# Patient Record
Sex: FEMALE | Race: OTHER | NOT HISPANIC OR LATINO | Employment: UNEMPLOYED | URBAN - METROPOLITAN AREA
[De-identification: names, ages, dates, MRNs, and addresses within clinical notes are randomized per-mention and may not be internally consistent; named-entity substitution may affect disease eponyms.]

---

## 2018-03-27 ENCOUNTER — HISTORICAL (OUTPATIENT)
Dept: ADMINISTRATIVE | Facility: HOSPITAL | Age: 48
End: 2018-03-27

## 2018-03-27 LAB
ABS NEUT (OLG): 2.7
ALBUMIN SERPL-MCNC: 4.3 GM/DL (ref 3.4–5)
ALBUMIN/GLOB SERPL: 1.72 {RATIO} (ref 1.5–2.5)
ALP SERPL-CCNC: 36 UNIT/L (ref 38–126)
ALT SERPL-CCNC: 22 UNIT/L (ref 7–52)
AST SERPL-CCNC: 17 UNIT/L (ref 15–37)
BILIRUB SERPL-MCNC: 0.4 MG/DL (ref 0.2–1)
BILIRUBIN DIRECT+TOT PNL SERPL-MCNC: 0.1 MG/DL (ref 0–0.5)
BUN SERPL-MCNC: 18 MG/DL (ref 7–18)
CALCIUM SERPL-MCNC: 8.9 MG/DL (ref 8.5–10)
CHLORIDE SERPL-SCNC: 105 MMOL/L (ref 98–107)
CO2 SERPL-SCNC: 28 MMOL/L (ref 21–32)
CREAT SERPL-MCNC: 0.63 MG/DL (ref 0.6–1.3)
CREAT/UREA NIT SERPL: 28.6
ERYTHROCYTE [DISTWIDTH] IN BLOOD BY AUTOMATED COUNT: 12.6 % (ref 11.5–17)
GGT SERPL-CCNC: 18 UNIT/L (ref 5–85)
GLOBULIN SER-MCNC: 2.5 GM/DL (ref 1.2–3)
GLUCOSE SERPL-MCNC: 95 MG/DL (ref 74–106)
H PYLORI AB SER IA-ACNC: POSITIVE
HCT VFR BLD AUTO: 37.1 % (ref 37–47)
HGB BLD-MCNC: 12 GM/DL (ref 12–16)
LDH SERPL-CCNC: 122 UNIT/L (ref 140–271)
LYMPHOCYTES # BLD AUTO: 2.1 X10(3)/MCL (ref 0.6–3.4)
LYMPHOCYTES NFR BLD AUTO: 41.3 % (ref 13–40)
MCH RBC QN AUTO: 29.8 PG (ref 27–31.2)
MCHC RBC AUTO-ENTMCNC: 32 GM/DL (ref 32–36)
MCV RBC AUTO: 92 FL (ref 80–94)
MONOCYTES # BLD AUTO: 0.3 X10(3)/MCL (ref 0–1.8)
MONOCYTES NFR BLD AUTO: 6.1 % (ref 0.1–24)
NEUTROPHILS NFR BLD AUTO: 52.6 % (ref 47–80)
PLATELET # BLD AUTO: 239 X10(3)/MCL (ref 130–400)
PMV BLD AUTO: 9.2 FL
POTASSIUM SERPL-SCNC: 4.2 MMOL/L (ref 3.5–5.1)
PROT SERPL-MCNC: 6.8 GM/DL (ref 6.4–8.2)
RBC # BLD AUTO: 4.03 X10(6)/MCL (ref 4.2–5.4)
SODIUM SERPL-SCNC: 138 MMOL/L (ref 136–145)
WBC # SPEC AUTO: 5.1 X10(3)/MCL (ref 4.5–11.5)

## 2018-11-30 ENCOUNTER — HISTORICAL (OUTPATIENT)
Dept: ADMINISTRATIVE | Facility: HOSPITAL | Age: 48
End: 2018-11-30

## 2018-11-30 LAB
ABS NEUT (OLG): 2.5 X10(3)/MCL (ref 2.1–9.2)
ALBUMIN SERPL-MCNC: 4.1 GM/DL (ref 3.4–5)
ALBUMIN/GLOB SERPL: 1.32 {RATIO} (ref 1.5–2.5)
ALP SERPL-CCNC: 33 UNIT/L (ref 38–126)
ALT SERPL-CCNC: 20 UNIT/L (ref 7–52)
APPEARANCE, UA: CLEAR
AST SERPL-CCNC: 19 UNIT/L (ref 15–37)
BACTERIA #/AREA URNS AUTO: ABNORMAL /HPF
BILIRUB SERPL-MCNC: 0.3 MG/DL (ref 0.2–1)
BILIRUB UR QL STRIP: NEGATIVE MG/DL
BILIRUBIN DIRECT+TOT PNL SERPL-MCNC: 0.1 MG/DL (ref 0–0.5)
BILIRUBIN DIRECT+TOT PNL SERPL-MCNC: 0.2 MG/DL
BUN SERPL-MCNC: 17 MG/DL (ref 7–18)
CALCIUM SERPL-MCNC: 8.5 MG/DL (ref 8.5–10)
CHLORIDE SERPL-SCNC: 101 MMOL/L (ref 98–107)
CHOLEST SERPL-MCNC: 127 MG/DL (ref 0–200)
CHOLEST/HDLC SERPL: 3.8 {RATIO}
CO2 SERPL-SCNC: 28 MMOL/L (ref 21–32)
COLOR UR: YELLOW
CREAT SERPL-MCNC: 0.61 MG/DL (ref 0.6–1.3)
ERYTHROCYTE [DISTWIDTH] IN BLOOD BY AUTOMATED COUNT: 14.1 % (ref 11.5–17)
GLOBULIN SER-MCNC: 3.1 GM/DL (ref 1.2–3)
GLUCOSE (UA): NEGATIVE MG/DL
GLUCOSE SERPL-MCNC: 95 MG/DL (ref 74–106)
H PYLORI AB SER IA-ACNC: POSITIVE
HCT VFR BLD AUTO: 33 % (ref 37–47)
HDLC SERPL-MCNC: 33 MG/DL (ref 35–60)
HGB BLD-MCNC: 9.4 GM/DL (ref 12–16)
HGB UR QL STRIP: NEGATIVE UNIT/L
KETONES UR QL STRIP: NEGATIVE MG/DL
LDLC SERPL CALC-MCNC: 72 MG/DL (ref 0–129)
LEUKOCYTE ESTERASE UR QL STRIP: NEGATIVE UNIT/L
LYMPHOCYTES # BLD AUTO: 2 X10(3)/MCL (ref 0.6–3.4)
LYMPHOCYTES NFR BLD AUTO: 41.9 % (ref 13–40)
MCH RBC QN AUTO: 23.9 PG (ref 27–31.2)
MCHC RBC AUTO-ENTMCNC: 28 GM/DL (ref 32–36)
MCV RBC AUTO: 84 FL (ref 80–94)
MONOCYTES # BLD AUTO: 0.3 X10(3)/MCL (ref 0.1–1.3)
MONOCYTES NFR BLD AUTO: 6.9 % (ref 0.1–24)
NEUTROPHILS NFR BLD AUTO: 51.2 % (ref 47–80)
NITRITE UR QL STRIP.AUTO: NEGATIVE
PH UR STRIP: 7 [PH]
PLATELET # BLD AUTO: 251 X10(3)/MCL (ref 130–400)
PMV BLD AUTO: 8.8 FL (ref 9.4–12.4)
POTASSIUM SERPL-SCNC: 4 MMOL/L (ref 3.5–5.1)
PROT SERPL-MCNC: 7.2 GM/DL (ref 6.4–8.2)
PROT UR QL STRIP: ABNORMAL MG/DL
RBC # BLD AUTO: 3.93 X10(6)/MCL (ref 4.2–5.4)
RBC #/AREA URNS HPF: ABNORMAL /HPF
SODIUM SERPL-SCNC: 134 MMOL/L (ref 136–145)
SP GR UR STRIP: 1.02
SQUAMOUS EPITHELIAL, UA: ABNORMAL /LPF
TRIGL SERPL-MCNC: 141 MG/DL (ref 30–150)
TSH SERPL-ACNC: 0.74 MIU/ML (ref 0.35–4.94)
UROBILINOGEN UR STRIP-ACNC: 0.2 MG/DL
VLDLC SERPL CALC-MCNC: 28.2 MG/DL
WBC # SPEC AUTO: 4.8 X10(3)/MCL (ref 4.5–11.5)
WBC #/AREA URNS AUTO: ABNORMAL /[HPF]

## 2019-03-25 ENCOUNTER — HISTORICAL (OUTPATIENT)
Dept: ADMINISTRATIVE | Facility: HOSPITAL | Age: 49
End: 2019-03-25

## 2019-03-25 LAB
ABS NEUT (OLG): 4.4 X10(3)/MCL (ref 2.1–9.2)
ERYTHROCYTE [DISTWIDTH] IN BLOOD BY AUTOMATED COUNT: 15.3 % (ref 11.5–17)
HCT VFR BLD AUTO: 29.5 % (ref 37–47)
HGB BLD-MCNC: 8.5 GM/DL (ref 12–16)
LYMPHOCYTES # BLD AUTO: 2.6 X10(3)/MCL (ref 0.6–3.4)
LYMPHOCYTES NFR BLD AUTO: 35.5 % (ref 13–40)
MCH RBC QN AUTO: 22 PG (ref 27–31.2)
MCHC RBC AUTO-ENTMCNC: 29 GM/DL (ref 32–36)
MCV RBC AUTO: 76 FL (ref 80–94)
MONOCYTES # BLD AUTO: 0.3 X10(3)/MCL (ref 0.1–1.3)
MONOCYTES NFR BLD AUTO: 4.5 % (ref 0.1–24)
NEUTROPHILS NFR BLD AUTO: 60 % (ref 47–80)
PLATELET # BLD AUTO: 331 X10(3)/MCL (ref 130–400)
PMV BLD AUTO: 8.4 FL (ref 9.4–12.4)
RBC # BLD AUTO: 3.87 X10(6)/MCL (ref 4.2–5.4)
WBC # SPEC AUTO: 7.3 X10(3)/MCL (ref 4.5–11.5)

## 2019-08-08 ENCOUNTER — HISTORICAL (OUTPATIENT)
Dept: ADMINISTRATIVE | Facility: HOSPITAL | Age: 49
End: 2019-08-08

## 2019-08-08 LAB
ABS NEUT (OLG): 2.8 X10(3)/MCL (ref 2.1–9.2)
ALBUMIN SERPL-MCNC: 4.3 GM/DL (ref 3.4–5)
ALBUMIN/GLOB SERPL: 1.54 {RATIO} (ref 1.5–2.5)
ALP SERPL-CCNC: 40 UNIT/L (ref 38–126)
ALT SERPL-CCNC: 12 UNIT/L (ref 7–52)
AST SERPL-CCNC: 13 UNIT/L (ref 15–37)
BILIRUB SERPL-MCNC: 0.3 MG/DL (ref 0.2–1)
BILIRUBIN DIRECT+TOT PNL SERPL-MCNC: 0.1 MG/DL (ref 0–0.5)
BILIRUBIN DIRECT+TOT PNL SERPL-MCNC: 0.2 MG/DL
BUN SERPL-MCNC: 29 MG/DL (ref 7–18)
CALCIUM SERPL-MCNC: 9.1 MG/DL (ref 8.5–10)
CHLORIDE SERPL-SCNC: 104 MMOL/L (ref 98–107)
CO2 SERPL-SCNC: 26 MMOL/L (ref 21–32)
CREAT SERPL-MCNC: 0.7 MG/DL (ref 0.6–1.3)
ERYTHROCYTE [DISTWIDTH] IN BLOOD BY AUTOMATED COUNT: 17.3 % (ref 11.5–17)
GLOBULIN SER-MCNC: 2.8 GM/DL (ref 1.2–3)
GLUCOSE SERPL-MCNC: 92 MG/DL (ref 74–106)
H PYLORI AB SER IA-ACNC: POSITIVE
HCT VFR BLD AUTO: 29 % (ref 37–47)
HGB BLD-MCNC: 8.4 GM/DL (ref 12–16)
LYMPHOCYTES # BLD AUTO: 2.4 X10(3)/MCL (ref 0.6–3.4)
LYMPHOCYTES NFR BLD AUTO: 43.9 % (ref 13–40)
MCH RBC QN AUTO: 20.8 PG (ref 27–31.2)
MCHC RBC AUTO-ENTMCNC: 29 GM/DL (ref 32–36)
MCV RBC AUTO: 72 FL (ref 80–94)
MONOCYTES # BLD AUTO: 0.3 X10(3)/MCL (ref 0.1–1.3)
MONOCYTES NFR BLD AUTO: 5.9 % (ref 0.1–24)
NEUTROPHILS NFR BLD AUTO: 50.2 % (ref 47–80)
PLATELET # BLD AUTO: 327 X10(3)/MCL (ref 130–400)
PMV BLD AUTO: 8.7 FL (ref 9.4–12.4)
POTASSIUM SERPL-SCNC: 4.1 MMOL/L (ref 3.5–5.1)
PROT SERPL-MCNC: 7.1 GM/DL (ref 6.4–8.2)
RBC # BLD AUTO: 4.04 X10(6)/MCL (ref 4.2–5.4)
SODIUM SERPL-SCNC: 137 MMOL/L (ref 136–145)
TSH SERPL-ACNC: 0.58 MIU/ML (ref 0.35–4.94)
WBC # SPEC AUTO: 5.5 X10(3)/MCL (ref 4.5–11.5)

## 2019-08-21 LAB — CRC RECOMMENDATION EXT: NORMAL

## 2019-09-03 ENCOUNTER — HISTORICAL (OUTPATIENT)
Dept: LAB | Facility: HOSPITAL | Age: 49
End: 2019-09-03

## 2020-10-16 ENCOUNTER — HISTORICAL (OUTPATIENT)
Dept: ADMINISTRATIVE | Facility: HOSPITAL | Age: 50
End: 2020-10-16

## 2020-10-16 LAB
ABS NEUT (OLG): 2.94 X10(3)/MCL (ref 2.1–9.2)
ALBUMIN SERPL-MCNC: 3.4 GM/DL (ref 3.5–5)
ALBUMIN/GLOB SERPL: 0.9 RATIO (ref 1.1–2)
ALP SERPL-CCNC: 60 UNIT/L (ref 40–150)
ALT SERPL-CCNC: 29 UNIT/L (ref 0–55)
AST SERPL-CCNC: 21 UNIT/L (ref 5–34)
BASOPHILS # BLD AUTO: 0 X10(3)/MCL (ref 0–0.2)
BASOPHILS NFR BLD AUTO: 0 %
BILIRUB SERPL-MCNC: 0.4 MG/DL
BILIRUBIN DIRECT+TOT PNL SERPL-MCNC: 0.2 MG/DL (ref 0–0.5)
BILIRUBIN DIRECT+TOT PNL SERPL-MCNC: 0.2 MG/DL (ref 0–0.8)
BUN SERPL-MCNC: 18.9 MG/DL (ref 9.8–20.1)
CALCIUM SERPL-MCNC: 8.9 MG/DL (ref 8.4–10.2)
CHLORIDE SERPL-SCNC: 106 MMOL/L (ref 98–107)
CHOLEST SERPL-MCNC: 134 MG/DL
CHOLEST/HDLC SERPL: 4 {RATIO} (ref 0–5)
CO2 SERPL-SCNC: 23 MMOL/L (ref 22–29)
CREAT SERPL-MCNC: 0.64 MG/DL (ref 0.55–1.02)
DEPRECATED CALCIDIOL+CALCIFEROL SERPL-MC: 12.2 NG/ML (ref 6.6–49.9)
EOSINOPHIL # BLD AUTO: 0.2 X10(3)/MCL (ref 0–0.9)
EOSINOPHIL NFR BLD AUTO: 3 %
ERYTHROCYTE [DISTWIDTH] IN BLOOD BY AUTOMATED COUNT: 13.2 % (ref 11.5–17)
EST. AVERAGE GLUCOSE BLD GHB EST-MCNC: 116.9 MG/DL
GLOBULIN SER-MCNC: 3.7 GM/DL (ref 2.4–3.5)
GLUCOSE SERPL-MCNC: 87 MG/DL (ref 74–100)
HBA1C MFR BLD: 5.7 %
HCT VFR BLD AUTO: 38.4 % (ref 37–47)
HDLC SERPL-MCNC: 35 MG/DL (ref 35–60)
HGB BLD-MCNC: 12.6 GM/DL (ref 12–16)
LDLC SERPL CALC-MCNC: 75 MG/DL (ref 50–140)
LYMPHOCYTES # BLD AUTO: 2.2 X10(3)/MCL (ref 0.6–4.6)
LYMPHOCYTES NFR BLD AUTO: 39 %
MCH RBC QN AUTO: 29.6 PG (ref 27–31)
MCHC RBC AUTO-ENTMCNC: 32.8 GM/DL (ref 33–36)
MCV RBC AUTO: 90.4 FL (ref 80–94)
MONOCYTES # BLD AUTO: 0.2 X10(3)/MCL (ref 0.1–1.3)
MONOCYTES NFR BLD AUTO: 4 %
NEUTROPHILS # BLD AUTO: 2.94 X10(3)/MCL (ref 2.1–9.2)
NEUTROPHILS NFR BLD AUTO: 53 %
PLATELET # BLD AUTO: 274 X10(3)/MCL (ref 130–400)
PMV BLD AUTO: 10.9 FL (ref 9.4–12.4)
POTASSIUM SERPL-SCNC: 4.2 MMOL/L (ref 3.5–5.1)
PROT SERPL-MCNC: 7.1 GM/DL (ref 6.4–8.3)
RBC # BLD AUTO: 4.25 X10(6)/MCL (ref 4.2–5.4)
SODIUM SERPL-SCNC: 141 MMOL/L (ref 136–145)
TRIGL SERPL-MCNC: 118 MG/DL (ref 37–140)
TSH SERPL-ACNC: 0.94 UIU/ML (ref 0.35–4.94)
VLDLC SERPL CALC-MCNC: 24 MG/DL
WBC # SPEC AUTO: 5.5 X10(3)/MCL (ref 4.5–11.5)

## 2021-06-07 LAB — BCS RECOMMENDATION EXT: NORMAL

## 2021-09-07 ENCOUNTER — HISTORICAL (OUTPATIENT)
Dept: RADIOLOGY | Facility: HOSPITAL | Age: 51
End: 2021-09-07

## 2021-09-07 LAB
T3RU NFR SERPL: 28.38 % (ref 31–39)
T4 FREE SERPL-MCNC: 0.84 NG/DL (ref 0.7–1.48)
TSH SERPL-ACNC: 0.64 UIU/ML (ref 0.35–4.94)

## 2022-02-25 ENCOUNTER — HISTORICAL (OUTPATIENT)
Dept: ADMINISTRATIVE | Facility: HOSPITAL | Age: 52
End: 2022-02-25

## 2022-02-25 LAB
ABS NEUT (OLG): 3.31 (ref 2.1–9.2)
ALBUMIN SERPL-MCNC: 3.9 G/DL (ref 3.5–5)
ALBUMIN/GLOB SERPL: 1.2 {RATIO} (ref 1.1–2)
ALP SERPL-CCNC: 59 U/L (ref 40–150)
ALT SERPL-CCNC: 24 U/L (ref 0–55)
AST SERPL-CCNC: 19 U/L (ref 5–34)
BASOPHILS # BLD AUTO: 0 10*3/UL (ref 0–0.2)
BASOPHILS NFR BLD AUTO: 1 %
BILIRUB SERPL-MCNC: 0.4 MG/DL
BILIRUBIN DIRECT+TOT PNL SERPL-MCNC: 0.2 (ref 0–0.5)
BILIRUBIN DIRECT+TOT PNL SERPL-MCNC: 0.2 (ref 0–0.8)
BUN SERPL-MCNC: 20.9 MG/DL (ref 9.8–20.1)
CALCIUM SERPL-MCNC: 9.3 MG/DL (ref 8.7–10.5)
CHLORIDE SERPL-SCNC: 106 MMOL/L (ref 98–107)
CHOLEST SERPL-MCNC: 140 MG/DL
CHOLEST/HDLC SERPL: 4 {RATIO} (ref 0–5)
CO2 SERPL-SCNC: 22 MMOL/L (ref 22–29)
CREAT SERPL-MCNC: 0.67 MG/DL (ref 0.55–1.02)
DEPRECATED CALCIDIOL+CALCIFEROL SERPL-MC: 9.8 NG/ML (ref 30–80)
EOSINOPHIL # BLD AUTO: 0.2 10*3/UL (ref 0–0.9)
EOSINOPHIL NFR BLD AUTO: 3 %
ERYTHROCYTE [DISTWIDTH] IN BLOOD BY AUTOMATED COUNT: 14.8 % (ref 11.5–17)
EST. AVERAGE GLUCOSE BLD GHB EST-MCNC: 131.2 MG/DL
GLOBULIN SER-MCNC: 3.2 G/DL (ref 2.4–3.5)
GLUCOSE SERPL-MCNC: 115 MG/DL (ref 74–100)
HBA1C MFR BLD: 6.2 %
HCT VFR BLD AUTO: 34 % (ref 37–47)
HDLC SERPL-MCNC: 37 MG/DL (ref 35–60)
HEMOLYSIS INTERF INDEX SERPL-ACNC: 12
HGB BLD-MCNC: 10.3 G/DL (ref 12–16)
ICTERIC INTERF INDEX SERPL-ACNC: 0
LDLC SERPL CALC-MCNC: 71 MG/DL (ref 50–140)
LIPEMIC INTERF INDEX SERPL-ACNC: 3
LYMPHOCYTES # BLD AUTO: 1.4 10*3/UL (ref 0.6–4.6)
LYMPHOCYTES NFR BLD AUTO: 27 %
MANUAL DIFF? (OHS): NO
MCH RBC QN AUTO: 24.5 PG (ref 27–31)
MCHC RBC AUTO-ENTMCNC: 30.3 G/DL (ref 33–36)
MCV RBC AUTO: 80.8 FL (ref 80–94)
MONOCYTES # BLD AUTO: 0.3 10*3/UL (ref 0.1–1.3)
MONOCYTES NFR BLD AUTO: 6 %
NEUTROPHILS # BLD AUTO: 3.31 10*3/UL (ref 2.1–9.2)
NEUTROPHILS NFR BLD AUTO: 62 %
PLATELET # BLD AUTO: 268 10*3/UL (ref 130–400)
PMV BLD AUTO: 10.5 FL (ref 9.4–12.4)
POTASSIUM SERPL-SCNC: 4.1 MMOL/L (ref 3.5–5.1)
PROT SERPL-MCNC: 7.1 G/DL (ref 6.4–8.3)
RBC # BLD AUTO: 4.21 10*6/UL (ref 4.2–5.4)
SODIUM SERPL-SCNC: 139 MMOL/L (ref 136–145)
TRIGL SERPL-MCNC: 162 MG/DL (ref 37–140)
TSH SERPL-ACNC: 0.82 M[IU]/L (ref 0.35–4.94)
VLDLC SERPL CALC-MCNC: 32 MG/DL
WBC # SPEC AUTO: 5.3 10*3/UL (ref 4.5–11.5)

## 2022-04-11 ENCOUNTER — HISTORICAL (OUTPATIENT)
Dept: ADMINISTRATIVE | Facility: HOSPITAL | Age: 52
End: 2022-04-11
Payer: COMMERCIAL

## 2022-04-29 VITALS
SYSTOLIC BLOOD PRESSURE: 115 MMHG | OXYGEN SATURATION: 100 % | DIASTOLIC BLOOD PRESSURE: 78 MMHG | WEIGHT: 177.5 LBS | HEIGHT: 64 IN | BODY MASS INDEX: 30.3 KG/M2

## 2022-06-12 ENCOUNTER — HOSPITAL ENCOUNTER (EMERGENCY)
Facility: HOSPITAL | Age: 52
Discharge: HOME | End: 2022-06-12
Attending: EMERGENCY MEDICINE
Payer: COMMERCIAL

## 2022-06-12 VITALS
TEMPERATURE: 98 F | HEART RATE: 85 BPM | HEIGHT: 62 IN | SYSTOLIC BLOOD PRESSURE: 132 MMHG | RESPIRATION RATE: 18 BRPM | BODY MASS INDEX: 30.91 KG/M2 | WEIGHT: 168 LBS | OXYGEN SATURATION: 100 % | DIASTOLIC BLOOD PRESSURE: 81 MMHG

## 2022-06-12 DIAGNOSIS — K29.50 CHRONIC GASTRITIS WITHOUT BLEEDING, UNSPECIFIED GASTRITIS TYPE: ICD-10-CM

## 2022-06-12 DIAGNOSIS — R19.7 DIARRHEA, UNSPECIFIED TYPE: Primary | ICD-10-CM

## 2022-06-12 LAB
ALBUMIN SERPL-MCNC: 4.4 GM/DL (ref 3.5–5)
ALBUMIN/GLOB SERPL: 1.3 RATIO (ref 1.1–2)
ALP SERPL-CCNC: 72 UNIT/L (ref 40–150)
ALT SERPL-CCNC: 34 UNIT/L (ref 0–55)
APPEARANCE UR: ABNORMAL
AST SERPL-CCNC: 28 UNIT/L (ref 5–34)
B-HCG UR QL: NEGATIVE
BACTERIA #/AREA URNS AUTO: ABNORMAL /HPF
BASOPHILS # BLD AUTO: 0.04 X10(3)/MCL (ref 0–0.2)
BASOPHILS NFR BLD AUTO: 0.7 %
BILIRUB UR QL STRIP.AUTO: NEGATIVE MG/DL
BILIRUBIN DIRECT+TOT PNL SERPL-MCNC: 0.3 MG/DL
BUN SERPL-MCNC: 17.5 MG/DL (ref 9.8–20.1)
CALCIUM SERPL-MCNC: 9.4 MG/DL (ref 8.4–10.2)
CHLORIDE SERPL-SCNC: 108 MMOL/L (ref 98–107)
CO2 SERPL-SCNC: 20 MMOL/L (ref 22–29)
COLOR UR AUTO: YELLOW
CREAT SERPL-MCNC: 0.71 MG/DL (ref 0.55–1.02)
CTP QC/QA: YES
EOSINOPHIL # BLD AUTO: 0.13 X10(3)/MCL (ref 0–0.9)
EOSINOPHIL NFR BLD AUTO: 2.1 %
ERYTHROCYTE [DISTWIDTH] IN BLOOD BY AUTOMATED COUNT: 17.1 % (ref 11.5–17)
GLOBULIN SER-MCNC: 3.4 GM/DL (ref 2.4–3.5)
GLUCOSE SERPL-MCNC: 90 MG/DL (ref 74–100)
GLUCOSE UR QL STRIP.AUTO: NEGATIVE MG/DL
HCT VFR BLD AUTO: 35.1 % (ref 37–47)
HGB BLD-MCNC: 10.2 GM/DL (ref 12–16)
IMM GRANULOCYTES # BLD AUTO: 0.01 X10(3)/MCL (ref 0–0.02)
IMM GRANULOCYTES NFR BLD AUTO: 0.2 % (ref 0–0.43)
KETONES UR QL STRIP.AUTO: ABNORMAL MG/DL
LEUKOCYTE ESTERASE UR QL STRIP.AUTO: ABNORMAL UNIT/L
LIPASE SERPL-CCNC: 26 U/L
LYMPHOCYTES # BLD AUTO: 2.49 X10(3)/MCL (ref 0.6–4.6)
LYMPHOCYTES NFR BLD AUTO: 40.6 %
MCH RBC QN AUTO: 22 PG (ref 27–31)
MCHC RBC AUTO-ENTMCNC: 29.1 MG/DL (ref 33–36)
MCV RBC AUTO: 75.6 FL (ref 80–94)
MONOCYTES # BLD AUTO: 0.32 X10(3)/MCL (ref 0.1–1.3)
MONOCYTES NFR BLD AUTO: 5.2 %
MUCOUS THREADS URNS QL MICRO: ABNORMAL /LPF
NEUTROPHILS # BLD AUTO: 3.1 X10(3)/MCL (ref 2.1–9.2)
NEUTROPHILS NFR BLD AUTO: 51.2 %
NITRITE UR QL STRIP.AUTO: NEGATIVE
NRBC BLD AUTO-RTO: 0 %
PH UR STRIP.AUTO: 6 [PH]
PLATELET # BLD AUTO: 352 X10(3)/MCL (ref 130–400)
PMV BLD AUTO: 10.3 FL (ref 9.4–12.4)
POTASSIUM SERPL-SCNC: 3.8 MMOL/L (ref 3.5–5.1)
PROT SERPL-MCNC: 7.8 GM/DL (ref 6.4–8.3)
PROT UR QL STRIP.AUTO: ABNORMAL MG/DL
RBC # BLD AUTO: 4.64 X10(6)/MCL (ref 4.2–5.4)
RBC #/AREA URNS AUTO: <5 /HPF
RBC UR QL AUTO: NEGATIVE UNIT/L
SODIUM SERPL-SCNC: 135 MMOL/L (ref 136–145)
SP GR UR STRIP.AUTO: 1.03 (ref 1–1.03)
SQUAMOUS #/AREA URNS AUTO: >36 /LPF
UROBILINOGEN UR STRIP-ACNC: 0.2 MG/DL
WBC # SPEC AUTO: 6.1 X10(3)/MCL (ref 4.5–11.5)
WBC #/AREA URNS AUTO: 11 /HPF

## 2022-06-12 PROCEDURE — 96360 HYDRATION IV INFUSION INIT: CPT

## 2022-06-12 PROCEDURE — 81025 URINE PREGNANCY TEST: CPT | Performed by: PHYSICIAN ASSISTANT

## 2022-06-12 PROCEDURE — 99284 EMERGENCY DEPT VISIT MOD MDM: CPT | Mod: 25

## 2022-06-12 PROCEDURE — 85025 COMPLETE CBC W/AUTO DIFF WBC: CPT | Performed by: PHYSICIAN ASSISTANT

## 2022-06-12 PROCEDURE — 25000003 PHARM REV CODE 250: Performed by: PHYSICIAN ASSISTANT

## 2022-06-12 PROCEDURE — 83690 ASSAY OF LIPASE: CPT | Performed by: PHYSICIAN ASSISTANT

## 2022-06-12 PROCEDURE — 36415 COLL VENOUS BLD VENIPUNCTURE: CPT | Performed by: PHYSICIAN ASSISTANT

## 2022-06-12 PROCEDURE — 80053 COMPREHEN METABOLIC PANEL: CPT | Performed by: PHYSICIAN ASSISTANT

## 2022-06-12 PROCEDURE — 81001 URINALYSIS AUTO W/SCOPE: CPT | Performed by: PHYSICIAN ASSISTANT

## 2022-06-12 RX ORDER — OMEPRAZOLE 40 MG/1
40 CAPSULE, DELAYED RELEASE ORAL DAILY
COMMUNITY
Start: 2022-06-10 | End: 2022-06-12

## 2022-06-12 RX ORDER — FAMOTIDINE 20 MG/1
20 TABLET, FILM COATED ORAL
Status: COMPLETED | OUTPATIENT
Start: 2022-06-12 | End: 2022-06-12

## 2022-06-12 RX ORDER — OMEPRAZOLE 20 MG/1
20 CAPSULE, DELAYED RELEASE ORAL DAILY
Qty: 14 CAPSULE | Refills: 0 | Status: SHIPPED | OUTPATIENT
Start: 2022-06-12 | End: 2023-02-22 | Stop reason: DRUGHIGH

## 2022-06-12 RX ORDER — ONDANSETRON 4 MG/1
8 TABLET, ORALLY DISINTEGRATING ORAL EVERY 8 HOURS PRN
Qty: 12 TABLET | Refills: 0 | Status: SHIPPED | OUTPATIENT
Start: 2022-06-12 | End: 2022-06-14

## 2022-06-12 RX ORDER — SUCRALFATE 1 G/10ML
1 SUSPENSION ORAL 4 TIMES DAILY
Qty: 280 ML | Refills: 0 | Status: SHIPPED | OUTPATIENT
Start: 2022-06-12 | End: 2022-06-19

## 2022-06-12 RX ADMIN — Medication 20 MG: at 05:06

## 2022-06-12 RX ADMIN — SODIUM CHLORIDE 1000 ML: 9 INJECTION, SOLUTION INTRAVENOUS at 05:06

## 2022-06-12 NOTE — ED PROVIDER NOTES
Encounter Date: 6/12/2022       History     Chief Complaint   Patient presents with    Diarrhea     Patient reports diarrhea, nausea and abd cramping for 10 days     Patient presents with:  Diarrhea: Patient reports diarrhea, nausea and abd cramping for 10 days    I Moe Jodie WILLIS assumed care of pt at 16 30, female reports the last 10 days having 7 episodes of daily nonbloody diarrhea without fever or vomiting.  Patient reports history of stomach ulcer previously on PPI now on over-the-counter Pepto for the last 4 months.  Patient reports initial evaluation emergency department today for severe diffuse abdominal cramping pain onset at 10:00 a.m. trial of home water and medication no relief transported by daughter to emergency department for evaluation.    The history is provided by the patient and a relative. The history is limited by a language barrier. A  was used.   Diarrhea   This is a new problem. The current episode started several days ago. The problem occurs more than 10 times per day. The stool consistency is described as watery. Associated symptoms include abdominal pain. Pertinent negatives include no arthralgias, chills, coughing, fever, myalgias, sweats or vomiting.     Review of patient's allergies indicates:  No Known Allergies  No past medical history on file.  No past surgical history on file.  No family history on file.     Review of Systems   Constitutional: Negative for chills, diaphoresis and fever.   HENT: Negative for congestion and postnasal drip.    Respiratory: Negative for cough and shortness of breath.    Cardiovascular: Negative for chest pain.   Gastrointestinal: Positive for abdominal pain, diarrhea and nausea. Negative for blood in stool, constipation and vomiting.   Endocrine: Negative.    Genitourinary: Negative for dysuria.   Musculoskeletal: Negative for arthralgias, back pain and myalgias.   Skin: Negative.    Neurological: Negative.     Psychiatric/Behavioral: Negative.        Physical Exam     Initial Vitals [06/12/22 1416]   BP Pulse Resp Temp SpO2   120/79 73 18 98.4 °F (36.9 °C) 99 %      MAP       --         Physical Exam    Nursing note and vitals reviewed.  Constitutional: She appears well-developed and well-nourished. She is not diaphoretic. She is active.  Non-toxic appearance. No distress.   HENT:   Head: Normocephalic and atraumatic.   Neck: Trachea normal. Neck supple.   Normal range of motion.  Cardiovascular: Normal rate, regular rhythm and normal pulses.   Pulmonary/Chest: Breath sounds normal.   Abdominal: Abdomen is soft. She exhibits no distension. There is no abdominal tenderness. There is no rebound and no guarding.   Musculoskeletal:         General: Normal range of motion.      Right upper arm: Normal.      Left upper arm: Normal.      Cervical back: Normal, normal range of motion and neck supple.      Thoracic back: Normal.      Lumbar back: Normal.      Right upper leg: Normal.      Left upper leg: Normal.      Right lower leg: Normal.      Left lower leg: Normal.     Neurological: She is alert and oriented to person, place, and time. She has normal strength.   Skin: Skin is warm, dry and intact.   Psychiatric: She has a normal mood and affect.         ED Course   Procedures  Labs Reviewed   COMPREHENSIVE METABOLIC PANEL - Abnormal; Notable for the following components:       Result Value    Sodium Level 135 (*)     Chloride 108 (*)     Carbon Dioxide 20 (*)     All other components within normal limits   URINALYSIS, REFLEX TO URINE CULTURE - Abnormal; Notable for the following components:    Appearance, UA Cloudy (*)     Specific Gravity, UA 1.031 (*)     Protein, UA 1+ (*)     Ketones, UA Trace (*)     Leukocyte Esterase, UA Trace (*)     All other components within normal limits   CBC WITH DIFFERENTIAL - Abnormal; Notable for the following components:    Hgb 10.2 (*)     Hct 35.1 (*)     MCV 75.6 (*)     MCH 22.0 (*)      MCHC 29.1 (*)     RDW 17.1 (*)     All other components within normal limits   URINALYSIS, MICROSCOPIC - Abnormal; Notable for the following components:    WBC, UA 11 (*)     Squamous Epithelial Cells, UA >36 (*)     Mucous, UA Large (*)     All other components within normal limits   LIPASE - Normal   CULTURE, URINE   CBC W/ AUTO DIFFERENTIAL    Narrative:     The following orders were created for panel order CBC Auto Differential.  Procedure                               Abnormality         Status                     ---------                               -----------         ------                     CBC with Differential[[id number]]        Abnormal            Final result                 Please view results for these tests on the individual orders.   POCT URINE PREGNANCY          Imaging Results    None          Medications   sodium chloride 0.9% bolus 1,000 mL (0 mLs Intravenous Stopped 6/12/22 1800)   famotidine tablet 20 mg (20 mg Oral Given 6/12/22 1700)     Medical Decision Making:   Initial Assessment:   Female with acute diarrhea and chronic GERD presents to emergency department for evaluation  Differential Diagnosis:   Acute diarrhea, chronic GERD, abdominal cramps, dehydration             ED Course as of 06/12/22 1954   Sun Jun 12, 2022 1953 Patient resting comfortably in room reports improved greatly with rest offered IV fluid hydration and accepts.  Patient completing IV fluids she is ready for discharge.  Abdomen soft and nontender all quadrants on repeat exam.  Patient understands discharge plan with acute diarrhea OTC anti diarrheal in addition to new PPI for chronic GERD and follow-up. [TQ]   1953 Lipase: 26 [TQ]   1953 WBC: 6.1 [TQ]   1953 Hemoglobin(!): 10.2 [TQ]   1953 Hematocrit(!): 35.1 [TQ]   1954 Sodium(!): 135 [TQ]   1954 Potassium: 3.8 [TQ]   1954 Chloride(!): 108 [TQ]   1954 CO2(!): 20 [TQ]      ED Course User Index  [TQ] LAZARO Reeder             Clinical Impression:    Final diagnoses:  [R19.7] Diarrhea, unspecified type (Primary)  [K29.50] Chronic gastritis without bleeding, unspecified gastritis type          ED Disposition Condition    Discharge Stable        ED Prescriptions     Medication Sig Dispense Start Date End Date Auth. Provider    omeprazole (PRILOSEC) 20 MG capsule Take 1 capsule (20 mg total) by mouth once daily. for 14 days 14 capsule 6/12/2022 6/26/2022 LAZARO Reeder    ondansetron (ZOFRAN-ODT) 4 MG TbDL Take 2 tablets (8 mg total) by mouth every 8 (eight) hours as needed (nausea or vomiting). 12 tablet 6/12/2022 6/14/2022 LAZARO Reeder    sucralfate (CARAFATE) 100 mg/mL suspension Take 10 mLs (1 g total) by mouth 4 (four) times daily. for 7 days 280 mL 6/12/2022 6/19/2022 LAZARO Reeder        Follow-up Information    None          LAZARO Reeder  06/12/22 1954

## 2022-06-12 NOTE — FIRST PROVIDER EVALUATION
Medical screening exam completed.  I have conducted a focused provider triage encounter, findings are as follows:    Brief history of present illness:  51 year old female presents to ED for abdominal pain, diarrhea, and nausea x 10 days; Denies vomiting, rectal bleeding     There were no vitals filed for this visit.    Pertinent physical exam:  Awake, alert     Brief workup plan:  Labs, UA     Preliminary workup initiated; this workup will be continued and followed by the physician or advanced practice provider that is assigned to the patient when roomed.

## 2022-06-12 NOTE — DISCHARGE INSTRUCTIONS
Recommend Imodium for diarrhea over the counter.  Recommend omeprazole and carafate for stomach ulcer.  May alternate mylanta, maalox or pepto for gastrointestinal discomfort.  Zofran if needed for nausea or vomiting.  Recommend follow up with primary care physician this week to review acute diarrhea and chronic gastritis for reevalaution, management and possible GI referral.

## 2022-06-14 LAB — BACTERIA UR CULT: NORMAL

## 2022-07-23 ENCOUNTER — PATIENT OUTREACH (OUTPATIENT)
Dept: ADMINISTRATIVE | Facility: HOSPITAL | Age: 52
End: 2022-07-23
Payer: COMMERCIAL

## 2022-07-23 NOTE — PROGRESS NOTES
Population Health. Out Reach.  The following record(s)  below were uploaded for Health Maintenance .    6/7/21 MAMMOGRAM SCREENING            8/21/19 COLONOSCOPY

## 2022-09-02 ENCOUNTER — TELEPHONE (OUTPATIENT)
Dept: INTERNAL MEDICINE | Facility: CLINIC | Age: 52
End: 2022-09-02
Payer: COMMERCIAL

## 2022-09-08 ENCOUNTER — LAB VISIT (OUTPATIENT)
Dept: LAB | Facility: HOSPITAL | Age: 52
End: 2022-09-08
Attending: INTERNAL MEDICINE
Payer: COMMERCIAL

## 2022-09-08 ENCOUNTER — OFFICE VISIT (OUTPATIENT)
Dept: INTERNAL MEDICINE | Facility: CLINIC | Age: 52
End: 2022-09-08
Payer: COMMERCIAL

## 2022-09-08 VITALS
BODY MASS INDEX: 30.91 KG/M2 | OXYGEN SATURATION: 99 % | HEART RATE: 90 BPM | HEIGHT: 62 IN | DIASTOLIC BLOOD PRESSURE: 74 MMHG | SYSTOLIC BLOOD PRESSURE: 112 MMHG | TEMPERATURE: 97 F | WEIGHT: 168 LBS

## 2022-09-08 DIAGNOSIS — E88.819 INSULIN RESISTANCE: ICD-10-CM

## 2022-09-08 DIAGNOSIS — R79.89 ABNORMAL THYROID STIMULATING HORMONE LEVEL: ICD-10-CM

## 2022-09-08 DIAGNOSIS — E55.9 VITAMIN D INSUFFICIENCY: Primary | ICD-10-CM

## 2022-09-08 DIAGNOSIS — Z12.31 BREAST CANCER SCREENING BY MAMMOGRAM: ICD-10-CM

## 2022-09-08 DIAGNOSIS — G43.009 MIGRAINE WITHOUT AURA AND WITHOUT STATUS MIGRAINOSUS, NOT INTRACTABLE: ICD-10-CM

## 2022-09-08 LAB
EST. AVERAGE GLUCOSE BLD GHB EST-MCNC: 105.4 MG/DL
HBA1C MFR BLD: 5.3 %
T4 FREE SERPL-MCNC: 0.86 NG/DL (ref 0.7–1.48)
TSH SERPL-ACNC: 0.43 UIU/ML (ref 0.35–4.94)

## 2022-09-08 PROCEDURE — 84443 ASSAY THYROID STIM HORMONE: CPT

## 2022-09-08 PROCEDURE — 99204 OFFICE O/P NEW MOD 45 MIN: CPT | Mod: ,,,

## 2022-09-08 PROCEDURE — 83036 HEMOGLOBIN GLYCOSYLATED A1C: CPT

## 2022-09-08 PROCEDURE — 36415 COLL VENOUS BLD VENIPUNCTURE: CPT

## 2022-09-08 PROCEDURE — 3074F PR MOST RECENT SYSTOLIC BLOOD PRESSURE < 130 MM HG: ICD-10-PCS | Mod: CPTII,,,

## 2022-09-08 PROCEDURE — 1160F RVW MEDS BY RX/DR IN RCRD: CPT | Mod: CPTII,,,

## 2022-09-08 PROCEDURE — 3074F SYST BP LT 130 MM HG: CPT | Mod: CPTII,,,

## 2022-09-08 PROCEDURE — 1160F PR REVIEW ALL MEDS BY PRESCRIBER/CLIN PHARMACIST DOCUMENTED: ICD-10-PCS | Mod: CPTII,,,

## 2022-09-08 PROCEDURE — 1159F MED LIST DOCD IN RCRD: CPT | Mod: CPTII,,,

## 2022-09-08 PROCEDURE — 84436 ASSAY OF TOTAL THYROXINE: CPT | Mod: XB

## 2022-09-08 PROCEDURE — 86376 MICROSOMAL ANTIBODY EACH: CPT

## 2022-09-08 PROCEDURE — 3078F PR MOST RECENT DIASTOLIC BLOOD PRESSURE < 80 MM HG: ICD-10-PCS | Mod: CPTII,,,

## 2022-09-08 PROCEDURE — 3008F BODY MASS INDEX DOCD: CPT | Mod: CPTII,,,

## 2022-09-08 PROCEDURE — 3008F PR BODY MASS INDEX (BMI) DOCUMENTED: ICD-10-PCS | Mod: CPTII,,,

## 2022-09-08 PROCEDURE — 3078F DIAST BP <80 MM HG: CPT | Mod: CPTII,,,

## 2022-09-08 PROCEDURE — 99204 PR OFFICE/OUTPT VISIT, NEW, LEVL IV, 45-59 MIN: ICD-10-PCS | Mod: ,,,

## 2022-09-08 PROCEDURE — 84480 ASSAY TRIIODOTHYRONINE (T3): CPT

## 2022-09-08 PROCEDURE — 1159F PR MEDICATION LIST DOCUMENTED IN MEDICAL RECORD: ICD-10-PCS | Mod: CPTII,,,

## 2022-09-08 PROCEDURE — 84439 ASSAY OF FREE THYROXINE: CPT

## 2022-09-08 RX ORDER — ERGOCALCIFEROL 1.25 MG/1
50000 CAPSULE ORAL
COMMUNITY
Start: 2022-09-05 | End: 2023-08-22

## 2022-09-08 RX ORDER — FLUTICASONE PROPIONATE 0.05 MG/G
1 OINTMENT TOPICAL DAILY
COMMUNITY
Start: 2022-03-15 | End: 2023-10-12 | Stop reason: SDUPTHER

## 2022-09-08 NOTE — ASSESSMENT & PLAN NOTE
-currently on ergocalciferol 83916 units twice weekly, continue titrate as needed   -obtain vitamin-D level today

## 2022-09-08 NOTE — ASSESSMENT & PLAN NOTE
-consider lower extremity weakness symptoms related to migraine   -obtain CT head rule out other etiologies

## 2022-09-08 NOTE — PROGRESS NOTES
Patient ID: Patricia Myers is a 52 y.o. female.    Chief Complaint: Follow-up    52-year-old female here today for 6 month follow-up visit.  Patient does not speak English is typically accompanied by her , originally from [facility].  Comorbidities include GERD , migraine headaches, thyroid nodule, insulin resistance, vitamin-D insufficiency.  Today reports with complaints of Bilateral lower extremity weakness and dry throat. Not currently on any antichronic anticholinergics or diuretics.  Does have some concerns due to previous history of thyroid nodule, for which we will obtain thyroid panel.  Previously noted to be insulin resistant at 6.2 which will obtain a A1c today.  Does occasionally have migraine headaches particularly after arguing with her family members with reported bilateral lower extremity weakness coinciding with episode.  CT head obtained to rule out other etiologies.  Otherwise no other acute medical concerns noted.     ENT: Dr Cain     Wellness: 2/25/2022   MMG:  Order today   Pap:  request records        MEDICAL HISTORY:  History reviewed. No pertinent past medical history.   Past Surgical History:   Procedure Laterality Date    COLONOSCOPY  08/21/2019      Social History     Tobacco Use    Smoking status: Never    Smokeless tobacco: Never   Substance Use Topics    Alcohol use: Not Currently          Health Maintenance Due   Topic Date Due    Hepatitis C Screening  Never done    Cervical Cancer Screening  Never done    COVID-19 Vaccine (1) Never done    HIV Screening  Never done    TETANUS VACCINE  Never done    Shingles Vaccine (1 of 2) Never done    Mammogram  06/07/2022    Influenza Vaccine (1) Never done          Patient Care Team:  Mirtha Avelar MD as PCP - General (Internal Medicine)  Mirtha Avelar MD as Consulting Physician (Internal Medicine)      Review of Systems   Constitutional:  Negative for fatigue and fever.   HENT:  Negative for congestion, rhinorrhea, sore  "throat and trouble swallowing.         Dry throat associated only with headaches or increased stress   Eyes:  Negative for redness and visual disturbance.   Respiratory:  Negative for cough, chest tightness and shortness of breath.    Cardiovascular:  Negative for chest pain and palpitations.   Gastrointestinal:  Negative for abdominal pain, constipation, diarrhea, nausea and vomiting.   Genitourinary:  Negative for dysuria, flank pain, frequency and urgency.   Musculoskeletal:  Negative for arthralgias, gait problem and myalgias.   Skin:  Negative for rash and wound.   Neurological:  Positive for weakness (intermittent only associated with headaches). Negative for facial asymmetry, speech difficulty and headaches.   All other systems reviewed and are negative.    Objective:   /74   Pulse 90   Temp 97.3 °F (36.3 °C)   Ht 5' 2" (1.575 m)   Wt 76.2 kg (168 lb)   SpO2 99%   BMI 30.73 kg/m²      Physical Exam  Constitutional:       General: She is not in acute distress.     Appearance: Normal appearance.   HENT:      Right Ear: Tympanic membrane, ear canal and external ear normal.      Left Ear: Tympanic membrane, ear canal and external ear normal.      Nose: Nose normal.      Mouth/Throat:      Mouth: Mucous membranes are moist.      Pharynx: Oropharynx is clear.   Eyes:      Extraocular Movements: Extraocular movements intact.      Conjunctiva/sclera: Conjunctivae normal.      Pupils: Pupils are equal, round, and reactive to light.   Cardiovascular:      Rate and Rhythm: Normal rate and regular rhythm.      Pulses: Normal pulses.      Heart sounds: Normal heart sounds. No murmur heard.    No gallop.   Pulmonary:      Effort: Pulmonary effort is normal.      Breath sounds: Normal breath sounds. No wheezing.   Abdominal:      General: Bowel sounds are normal. There is no distension.      Palpations: Abdomen is soft. There is no mass.      Tenderness: There is no abdominal tenderness. There is no guarding. "   Musculoskeletal:         General: Normal range of motion.   Skin:     General: Skin is warm and dry.   Neurological:      Mental Status: She is alert. Mental status is at baseline.      Sensory: No sensory deficit.      Motor: No weakness.       Assessment:     Problem List Items Addressed This Visit          Neuro    Migraine without aura and without status migrainosus, not intractable     -consider lower extremity weakness symptoms related to migraine   -obtain CT head rule out other etiologies         Relevant Orders    CT Head Without Contrast       Endocrine    Vitamin D insufficiency - Primary     -currently on ergocalciferol 26344 units twice weekly, continue titrate as needed   -obtain vitamin-D level today         Relevant Medications    ergocalciferol (ERGOCALCIFEROL) 50,000 unit Cap    Abnormal thyroid stimulating hormone level     -obtain thyroid panel  -Does have history of thyroid nodule         Relevant Orders    Thyroid peroxidase antibody    TSH    T3    T4    T4, free    Insulin resistance     Lab Results   Component Value Date    HGBA1C 6.2 02/25/2022   -obtain current HGB A1c   -avoid excessive carbohydrate intake  -encourage routine aerobic exercise           Relevant Orders    Hemoglobin A1C     Other Visit Diagnoses       Breast cancer screening by mammogram        Relevant Orders    Mammo Digital Screening Bilat             Plan:   Follow up in about 6 months (around 3/8/2023) for Wellness with labs.     -plan specifics discussed above    Orders Placed This Encounter    CT Head Without Contrast    Mammo Digital Screening Bilat    Thyroid peroxidase antibody    TSH    T3    T4    T4, free    Hemoglobin A1C        Medication List with Changes/Refills   Current Medications    ERGOCALCIFEROL (ERGOCALCIFEROL) 50,000 UNIT CAP    Take 50,000 Units by mouth twice a week.    FLUTICASONE PROPIONATE (CUTIVATE) 0.005 % OINTMENT    Apply topically 2 (two) times daily.    OMEPRAZOLE (PRILOSEC) 20 MG  CAPSULE    Take 1 capsule (20 mg total) by mouth once daily. for 14 days

## 2022-09-08 NOTE — ASSESSMENT & PLAN NOTE
Lab Results   Component Value Date    HGBA1C 6.2 02/25/2022   -obtain current HGB A1c   -avoid excessive carbohydrate intake  -encourage routine aerobic exercise

## 2022-09-10 LAB
T3 SERPL IA-MCNC: 110 NG/DL (ref 80–200)
T4 SERPL IA-MCNC: 8.2 MCG/DL (ref 4.5–11.7)
THYROPEROXIDASE AB SERPL-ACNC: 0.3 IU/ML

## 2022-09-12 ENCOUNTER — TELEPHONE (OUTPATIENT)
Dept: INTERNAL MEDICINE | Facility: CLINIC | Age: 52
End: 2022-09-12
Payer: COMMERCIAL

## 2022-09-12 NOTE — TELEPHONE ENCOUNTER
"----- Message from HUBERT Ashton sent at 9/12/2022 11:29 AM CDT -----  Please contact the patient and let them know that their most recent thyroid lab results were stable and do not require any change in treatment.  Her hemoglobin A1c is well controlled and she is no longer considered a "prediabetic".  Continue lifestyle modification with diet and exercise.  "

## 2022-10-31 ENCOUNTER — OFFICE VISIT (OUTPATIENT)
Dept: INTERNAL MEDICINE | Facility: CLINIC | Age: 52
End: 2022-10-31
Payer: COMMERCIAL

## 2022-10-31 VITALS
OXYGEN SATURATION: 98 % | DIASTOLIC BLOOD PRESSURE: 80 MMHG | HEART RATE: 64 BPM | BODY MASS INDEX: 27.93 KG/M2 | TEMPERATURE: 99 F | RESPIRATION RATE: 18 BRPM | WEIGHT: 167.63 LBS | SYSTOLIC BLOOD PRESSURE: 140 MMHG | HEIGHT: 65 IN

## 2022-10-31 DIAGNOSIS — M79.2 RADICULAR PAIN IN LEFT ARM: ICD-10-CM

## 2022-10-31 DIAGNOSIS — M54.2 CERVICALGIA: Primary | ICD-10-CM

## 2022-10-31 PROCEDURE — 3079F DIAST BP 80-89 MM HG: CPT | Mod: CPTII,,, | Performed by: INTERNAL MEDICINE

## 2022-10-31 PROCEDURE — 1160F RVW MEDS BY RX/DR IN RCRD: CPT | Mod: CPTII,,, | Performed by: INTERNAL MEDICINE

## 2022-10-31 PROCEDURE — 3077F PR MOST RECENT SYSTOLIC BLOOD PRESSURE >= 140 MM HG: ICD-10-PCS | Mod: CPTII,,, | Performed by: INTERNAL MEDICINE

## 2022-10-31 PROCEDURE — 1159F MED LIST DOCD IN RCRD: CPT | Mod: CPTII,,, | Performed by: INTERNAL MEDICINE

## 2022-10-31 PROCEDURE — 1159F PR MEDICATION LIST DOCUMENTED IN MEDICAL RECORD: ICD-10-PCS | Mod: CPTII,,, | Performed by: INTERNAL MEDICINE

## 2022-10-31 PROCEDURE — 3079F PR MOST RECENT DIASTOLIC BLOOD PRESSURE 80-89 MM HG: ICD-10-PCS | Mod: CPTII,,, | Performed by: INTERNAL MEDICINE

## 2022-10-31 PROCEDURE — 1160F PR REVIEW ALL MEDS BY PRESCRIBER/CLIN PHARMACIST DOCUMENTED: ICD-10-PCS | Mod: CPTII,,, | Performed by: INTERNAL MEDICINE

## 2022-10-31 PROCEDURE — 99214 PR OFFICE/OUTPT VISIT, EST, LEVL IV, 30-39 MIN: ICD-10-PCS | Mod: ,,, | Performed by: INTERNAL MEDICINE

## 2022-10-31 PROCEDURE — 99214 OFFICE O/P EST MOD 30 MIN: CPT | Mod: ,,, | Performed by: INTERNAL MEDICINE

## 2022-10-31 PROCEDURE — 3077F SYST BP >= 140 MM HG: CPT | Mod: CPTII,,, | Performed by: INTERNAL MEDICINE

## 2022-10-31 NOTE — PROGRESS NOTES
"Subjective:      Patient ID: Patricia Myers is a 52 y.o. female.    Chief Complaint: Back Pain (Back pain x 1 year )    52-year-old female here today with complaints of neck and back pain with radiation to the left arm with some tingling and numbness in the left arm.  Recently her left arm and also got swollen however swelling has now gone down.  The problem has been a persistent issue and she is accompanied by her son was requesting imaging studies.     Comorbidities include GERD , migraine headaches, thyroid nodule, insulin resistance, vitamin-D insufficiency.   Otherwise no other acute medical concerns noted.      ENT: Dr Cain      Wellness: 2/25/2022   MMG:  Order today   Pap:  request records    The patient's Health Maintenance was reviewed and the following appears to be due at this time:   Health Maintenance Due   Topic Date Due    Hepatitis C Screening  Never done    Cervical Cancer Screening  Never done    COVID-19 Vaccine (1) Never done    HIV Screening  Never done    TETANUS VACCINE  Never done    Shingles Vaccine (1 of 2) Never done    Mammogram  06/07/2022    Influenza Vaccine (1) Never done        Past Medical History:  History reviewed. No pertinent past medical history.  Past Surgical History:   Procedure Laterality Date    COLONOSCOPY  08/21/2019     Review of patient's allergies indicates:  No Known Allergies  Social History     Socioeconomic History    Marital status:    Tobacco Use    Smoking status: Never    Smokeless tobacco: Never   Substance and Sexual Activity    Alcohol use: Not Currently     History reviewed. No pertinent family history.    Review of Systems    A comprehensive review of systems was performed and is negative except for that stated above  Objective:   BP (!) 140/80 (BP Location: Left arm, Patient Position: Sitting, BP Method: Medium (Manual))   Pulse 64   Temp 99.3 °F (37.4 °C) (Temporal)   Resp 18   Ht 5' 5" (1.651 m)   Wt 76 kg (167 lb 9.6 oz)   SpO2 98%   " BMI 27.89 kg/m²     Physical Exam  Constitutional:       Appearance: Normal appearance.   HENT:      Head: Normocephalic and atraumatic.      Nose: Nose normal.      Mouth/Throat:      Mouth: Mucous membranes are moist.      Pharynx: Oropharynx is clear.   Eyes:      Extraocular Movements: Extraocular movements intact.      Pupils: Pupils are equal, round, and reactive to light.   Cardiovascular:      Rate and Rhythm: Normal rate and regular rhythm.      Pulses: Normal pulses.   Pulmonary:      Effort: Pulmonary effort is normal.      Breath sounds: Normal breath sounds.   Abdominal:      General: Bowel sounds are normal.      Palpations: Abdomen is soft.   Musculoskeletal:         General: Normal range of motion.      Cervical back: Normal range of motion and neck supple.   Skin:     General: Skin is warm.   Neurological:      General: No focal deficit present.      Mental Status: She is alert and oriented to person, place, and time. Mental status is at baseline.   Psychiatric:         Mood and Affect: Mood normal.     Assessment/ Plan:   1. Cervicalgia  -     MRI Cervical Spine Without Contrast; Future; Expected date: 10/31/2022    2. Radicular pain in left arm  Assessment & Plan:  -I will go ahead and get an MRI of the neck since patient's symptoms are more regular in nature going to her left hand.  -offered Mobic however patient reluctant to take medications  -currently taking Tylenol and ibuprofen and will continue to alternate  -heat/ice application and neck exercises have been discussed in detail

## 2022-11-04 ENCOUNTER — TELEPHONE (OUTPATIENT)
Dept: INTERNAL MEDICINE | Facility: CLINIC | Age: 52
End: 2022-11-04
Payer: COMMERCIAL

## 2022-11-04 DIAGNOSIS — M54.2 CERVICALGIA: Primary | ICD-10-CM

## 2022-11-04 DIAGNOSIS — M79.2 RADICULAR PAIN IN LEFT ARM: ICD-10-CM

## 2022-11-04 NOTE — TELEPHONE ENCOUNTER
Per Dr. Avelar  Please notify patient that MRI was denied by insurance.  Send referral to physical therapy to eval and treat so that we can monitor improvement and if at that time it is deemed necessary, MRI can be reordered after 3 months if no improvement noted     Spoke with Pt , information was given. Pt  spoke with his wife, and stated she understood. I let them know that they would get a phone call from the physical therapy facility to make her an appt.

## 2022-11-04 NOTE — TELEPHONE ENCOUNTER
----- Message from Abbey Chiki sent at 11/4/2022  7:45 AM CDT -----  Regarding: denial  Please be advised Stephen/Manuel has denied the MRI Cervical. A peer discussion may be completed to appeal this decision by calling [phone], ref# [id number]  to speak with a clinical review physician. See below for denial reason.    Denial Reason:      Imaging requires six weeks of provider directed treatment to be completed.This must have been completed in the past three months without improved symptoms. Office visit, must occur after the treatment is completed.    Denial has been scanned into medica manager. Please let us know how you would like to proceed with this case.     Thanks,     Abbey  [facility]

## 2022-11-06 ENCOUNTER — HOSPITAL ENCOUNTER (EMERGENCY)
Facility: HOSPITAL | Age: 52
Discharge: HOME | End: 2022-11-06
Attending: EMERGENCY MEDICINE
Payer: COMMERCIAL

## 2022-11-06 VITALS
RESPIRATION RATE: 16 BRPM | OXYGEN SATURATION: 100 % | WEIGHT: 168 LBS | BODY MASS INDEX: 27.96 KG/M2 | TEMPERATURE: 98 F | DIASTOLIC BLOOD PRESSURE: 77 MMHG | SYSTOLIC BLOOD PRESSURE: 124 MMHG | HEART RATE: 80 BPM

## 2022-11-06 DIAGNOSIS — M54.2 MUSCULOSKELETAL NECK PAIN: ICD-10-CM

## 2022-11-06 DIAGNOSIS — M54.2 NECK PAIN: Primary | ICD-10-CM

## 2022-11-06 PROCEDURE — 96372 THER/PROPH/DIAG INJ SC/IM: CPT | Performed by: NURSE PRACTITIONER

## 2022-11-06 PROCEDURE — 99284 EMERGENCY DEPT VISIT MOD MDM: CPT

## 2022-11-06 PROCEDURE — 63600175 PHARM REV CODE 636 W HCPCS: Performed by: NURSE PRACTITIONER

## 2022-11-06 PROCEDURE — 25000003 PHARM REV CODE 250: Performed by: NURSE PRACTITIONER

## 2022-11-06 RX ORDER — HYDROCODONE BITARTRATE AND ACETAMINOPHEN 7.5; 325 MG/1; MG/1
1 TABLET ORAL EVERY 6 HOURS PRN
Qty: 12 TABLET | Refills: 0 | Status: SHIPPED | OUTPATIENT
Start: 2022-11-06 | End: 2023-08-22

## 2022-11-06 RX ORDER — DEXAMETHASONE SODIUM PHOSPHATE 4 MG/ML
8 INJECTION, SOLUTION INTRA-ARTICULAR; INTRALESIONAL; INTRAMUSCULAR; INTRAVENOUS; SOFT TISSUE
Status: COMPLETED | OUTPATIENT
Start: 2022-11-06 | End: 2022-11-06

## 2022-11-06 RX ORDER — HYDROCODONE BITARTRATE AND ACETAMINOPHEN 7.5; 325 MG/1; MG/1
1 TABLET ORAL
Status: COMPLETED | OUTPATIENT
Start: 2022-11-06 | End: 2022-11-06

## 2022-11-06 RX ORDER — CYCLOBENZAPRINE HCL 10 MG
5 TABLET ORAL 3 TIMES DAILY PRN
Qty: 8 TABLET | Refills: 0 | Status: SHIPPED | OUTPATIENT
Start: 2022-11-06 | End: 2022-11-11

## 2022-11-06 RX ADMIN — HYDROCODONE BITARTRATE AND ACETAMINOPHEN 1 TABLET: 7.5; 325 TABLET ORAL at 08:11

## 2022-11-06 RX ADMIN — DEXAMETHASONE SODIUM PHOSPHATE 8 MG: 4 INJECTION, SOLUTION INTRA-ARTICULAR; INTRALESIONAL; INTRAMUSCULAR; INTRAVENOUS; SOFT TISSUE at 08:11

## 2022-11-07 DIAGNOSIS — B49 FUNGUS INFECTION: Primary | ICD-10-CM

## 2022-11-07 RX ORDER — FLUOCINOLONE ACETONIDE 0.1 MG/ML
1 SOLUTION TOPICAL 2 TIMES DAILY
COMMUNITY
Start: 2022-09-14 | End: 2023-10-12 | Stop reason: SDUPTHER

## 2022-11-07 RX ORDER — CLOTRIMAZOLE AND BETAMETHASONE DIPROPIONATE 10; .64 MG/G; MG/G
CREAM TOPICAL 2 TIMES DAILY
Qty: 45 G | Refills: 1 | Status: SHIPPED | OUTPATIENT
Start: 2022-11-07

## 2022-11-07 NOTE — ED PROVIDER NOTES
Encounter Date: 11/6/2022       History     Chief Complaint   Patient presents with    Back Pain    Neck Pain     Pt presents c/o neck pain and entire back pain. Onset x 1 1/2 years/ with flare up x2 days.  Denies trauma.      Patient states chronic neck pain that radiates into her bilateral upper back x 1-2 years. Denies any injury or trauma. States that symptoms are intermittent and have flared-up over the last few days. Denies any numbness or tingling to the extremities. States that symptoms worsen with movement.     Review of patient's allergies indicates:  No Known Allergies  No past medical history on file.  Past Surgical History:   Procedure Laterality Date    COLONOSCOPY  08/21/2019     No family history on file.  Social History     Tobacco Use    Smoking status: Never    Smokeless tobacco: Never   Substance Use Topics    Alcohol use: Not Currently     Review of Systems   Constitutional: Negative.  Negative for chills and fever.   HENT: Negative.     Eyes: Negative.    Respiratory: Negative.     Cardiovascular: Negative.    Gastrointestinal: Negative.    Endocrine: Negative.    Genitourinary: Negative.    Musculoskeletal:  Positive for back pain and neck pain.   Skin: Negative.  Negative for rash.   Allergic/Immunologic: Negative.    Neurological: Negative.  Negative for weakness, numbness and headaches.   Hematological: Negative.    Psychiatric/Behavioral: Negative.     All other systems reviewed and are negative.    Physical Exam     Initial Vitals [11/06/22 1522]   BP Pulse Resp Temp SpO2   111/71 88 18 98.1 °F (36.7 °C) 97 %      MAP       --         Physical Exam    Nursing note and vitals reviewed.  Constitutional: She appears well-developed and well-nourished. No distress.   HENT:   Head: Normocephalic and atraumatic.   Mouth/Throat: Oropharynx is clear and moist.   Eyes: Conjunctivae and EOM are normal. Pupils are equal, round, and reactive to light.   Neck: Neck supple.   Normal range of  motion.  Cardiovascular:  Normal rate, regular rhythm, normal heart sounds and intact distal pulses.           Pulmonary/Chest: Breath sounds normal. No respiratory distress.   Abdominal: Abdomen is soft. She exhibits no distension. There is no abdominal tenderness.   Musculoskeletal:         General: No edema. Normal range of motion.      Cervical back: Normal range of motion and neck supple. Tenderness (Mild bilateral paravertebral tenderness.) present. No bony tenderness. No pain with movement. Normal range of motion.      Thoracic back: No tenderness or bony tenderness. Normal range of motion.      Lumbar back: No tenderness or bony tenderness. Normal range of motion.     Neurological: She is alert and oriented to person, place, and time. She has normal strength. GCS score is 15. GCS eye subscore is 4. GCS verbal subscore is 5. GCS motor subscore is 6.   Skin: Skin is warm and dry. No rash noted.   Psychiatric: She has a normal mood and affect. Thought content normal.       ED Course   Procedures  Labs Reviewed - No data to display       Imaging Results              X-Ray Cervical Spine 2 or 3 Views (Final result)  Result time 11/06/22 20:51:29      Final result by Tony Erwin MD (11/06/22 20:51:29)                   Impression:      No acute osseous abnormality, fracture, or dislocation.    There is no significant degenerative change.      Electronically signed by: Tony Erwin  Date:    11/06/2022  Time:    20:51               Narrative:    EXAMINATION:  XR CERVICAL SPINE 2 OR 3 VIEWS    CLINICAL HISTORY:  neck pain;    TECHNIQUE:  Three views of the cervical spine.    COMPARISON:  03/25/2019    FINDINGS:  Straightening of the normal lordotic curvature.  Single 1 is symmetric about C2.  The odontoid is intact.  The prevertebral soft tissues are unremarkable.                        ED Interpretation by HUBERT Burton (11/06/22 20:50:24, [facility] - Emergency Dept, Emergency  Medicine)    No Acute Findings.                                      Medications   dexAMETHasone injection 8 mg (8 mg Intramuscular Given 11/6/22 2007)   HYDROcodone-acetaminophen 7.5-325 mg per tablet 1 tablet (1 tablet Oral Given 11/6/22 2008)     Medical Decision Making:   Initial Assessment:   Patient is awake, alert, afebrile, neurovascular intact, and ambulatory in the ED.   Differential Diagnosis:   Neck Pain, Back Pain, Musculoskeletal Pain.  Clinical Tests:   Radiological Study: Ordered and Reviewed                        Clinical Impression:   Final diagnoses:  [M54.2] Neck pain (Primary)  [M54.2] Musculoskeletal neck pain      ED Disposition Condition    Discharge Stable          ED Prescriptions       Medication Sig Dispense Start Date End Date Auth. Provider    cyclobenzaprine (FLEXERIL) 10 MG tablet Take 0.5 tablets (5 mg total) by mouth 3 (three) times daily as needed for Muscle spasms. 8 tablet 11/6/2022 11/11/2022 HUBERT Burton    HYDROcodone-acetaminophen (NORCO) 7.5-325 mg per tablet Take 1 tablet by mouth every 6 (six) hours as needed for Pain. 12 tablet 11/6/2022 -- HUBERT Burton          Follow-up Information       Follow up With Specialties Details Why Contact Info    Mirtha Avelar MD Internal Medicine In 1 week  [address] [place] [id number] [phone]               HUBERT Burton  11/06/22 8897

## 2023-03-02 ENCOUNTER — TELEPHONE (OUTPATIENT)
Dept: ADMINISTRATIVE | Facility: HOSPITAL | Age: 53
End: 2023-03-02
Payer: COMMERCIAL

## 2023-03-02 NOTE — TELEPHONE ENCOUNTER
----- Message from Maritza Faulkner MA sent at 3/2/2023 12:38 PM CST -----  Regarding: Dr GABO SIN Thursday 3-9-23       1. Are there any outstanding Labs, imaging or referrals in the patient's chart?      Wellness Appointment    Fasting wellness labs ordered and ready to do.     Last Wellness 2-25-22             2. . Has the patient been seen in an ER, urgent care clinic, or any other health care    provider since their last visit? If yes when and where?

## 2023-03-15 ENCOUNTER — PATIENT MESSAGE (OUTPATIENT)
Dept: ADMINISTRATIVE | Facility: HOSPITAL | Age: 53
End: 2023-03-15
Payer: COMMERCIAL

## 2023-04-17 ENCOUNTER — PATIENT MESSAGE (OUTPATIENT)
Dept: ADMINISTRATIVE | Facility: HOSPITAL | Age: 53
End: 2023-04-17
Payer: COMMERCIAL

## 2023-06-12 ENCOUNTER — TELEPHONE (OUTPATIENT)
Dept: INTERNAL MEDICINE | Facility: CLINIC | Age: 53
End: 2023-06-12
Payer: COMMERCIAL

## 2023-06-12 NOTE — TELEPHONE ENCOUNTER
----- Message from Radha Yin sent at 6/12/2023  3:09 PM CDT -----  Regarding: Referral  Pt  Naga asked if a referral for Gastro and for her Back pain can be sent to [facility] in [facility], they will be going there on June 24th.. please advise     [phone] Naga

## 2023-06-26 ENCOUNTER — PATIENT MESSAGE (OUTPATIENT)
Dept: ADMINISTRATIVE | Facility: HOSPITAL | Age: 53
End: 2023-06-26
Payer: COMMERCIAL

## 2023-06-27 ENCOUNTER — PATIENT MESSAGE (OUTPATIENT)
Dept: ADMINISTRATIVE | Facility: HOSPITAL | Age: 53
End: 2023-06-27
Payer: COMMERCIAL

## 2023-08-07 ENCOUNTER — TELEPHONE (OUTPATIENT)
Dept: INTERNAL MEDICINE | Facility: CLINIC | Age: 53
End: 2023-08-07
Payer: COMMERCIAL

## 2023-08-07 NOTE — TELEPHONE ENCOUNTER
----- Message from Fanny Tran sent at 8/7/2023 12:05 PM CDT -----  Regarding: med advice  .Type:  Needs Medical Advice    Who Called: Pt's   Symptoms (please be specific): coughing,fever,fatigue   How long has patient had these symptoms:    Pharmacy name and phone #:  [id number] - [place], [place] - [address] AT [facility] [address] [facility]  Would the patient rather a call back or a response via [facility]? Call back  Best Call Back Number: [phone]  Additional Information: Pt's  was tested positive for covid on Friday, pt is now shaving the same symptom as , pt has not been tested yet.

## 2023-08-22 ENCOUNTER — OFFICE VISIT (OUTPATIENT)
Dept: INTERNAL MEDICINE | Facility: CLINIC | Age: 53
End: 2023-08-22
Payer: COMMERCIAL

## 2023-08-22 DIAGNOSIS — E55.9 VITAMIN D INSUFFICIENCY: ICD-10-CM

## 2023-08-22 DIAGNOSIS — M79.2 RADICULAR PAIN IN LEFT ARM: ICD-10-CM

## 2023-08-22 DIAGNOSIS — R10.13 EPIGASTRIC PAIN: ICD-10-CM

## 2023-08-22 DIAGNOSIS — E88.819 INSULIN RESISTANCE: ICD-10-CM

## 2023-08-22 DIAGNOSIS — R06.02 SOB (SHORTNESS OF BREATH): ICD-10-CM

## 2023-08-22 DIAGNOSIS — H53.8 BLURRY VISION, BILATERAL: ICD-10-CM

## 2023-08-22 DIAGNOSIS — G43.009 MIGRAINE WITHOUT AURA AND WITHOUT STATUS MIGRAINOSUS, NOT INTRACTABLE: ICD-10-CM

## 2023-08-22 DIAGNOSIS — R06.09 DOE (DYSPNEA ON EXERTION): ICD-10-CM

## 2023-08-22 DIAGNOSIS — R42 DIZZINESS: ICD-10-CM

## 2023-08-22 DIAGNOSIS — Z12.4 SCREENING FOR CERVICAL CANCER: ICD-10-CM

## 2023-08-22 DIAGNOSIS — Z12.31 BREAST CANCER SCREENING BY MAMMOGRAM: ICD-10-CM

## 2023-08-22 DIAGNOSIS — Z00.00 WELLNESS EXAMINATION: Primary | ICD-10-CM

## 2023-08-22 PROCEDURE — 99396 PREV VISIT EST AGE 40-64: CPT | Mod: ,,, | Performed by: INTERNAL MEDICINE

## 2023-08-22 PROCEDURE — 1159F MED LIST DOCD IN RCRD: CPT | Mod: CPTII,,, | Performed by: INTERNAL MEDICINE

## 2023-08-22 PROCEDURE — 1160F PR REVIEW ALL MEDS BY PRESCRIBER/CLIN PHARMACIST DOCUMENTED: ICD-10-PCS | Mod: CPTII,,, | Performed by: INTERNAL MEDICINE

## 2023-08-22 PROCEDURE — 99214 PR OFFICE/OUTPT VISIT, EST, LEVL IV, 30-39 MIN: ICD-10-PCS | Mod: 25,,, | Performed by: INTERNAL MEDICINE

## 2023-08-22 PROCEDURE — 1159F PR MEDICATION LIST DOCUMENTED IN MEDICAL RECORD: ICD-10-PCS | Mod: CPTII,,, | Performed by: INTERNAL MEDICINE

## 2023-08-22 PROCEDURE — 99214 OFFICE O/P EST MOD 30 MIN: CPT | Mod: 25,,, | Performed by: INTERNAL MEDICINE

## 2023-08-22 PROCEDURE — 99396 PR PREVENTIVE VISIT,EST,40-64: ICD-10-PCS | Mod: ,,, | Performed by: INTERNAL MEDICINE

## 2023-08-22 PROCEDURE — 1160F RVW MEDS BY RX/DR IN RCRD: CPT | Mod: CPTII,,, | Performed by: INTERNAL MEDICINE

## 2023-08-22 RX ORDER — GABAPENTIN 100 MG/1
100 CAPSULE ORAL 2 TIMES DAILY
Qty: 60 CAPSULE | Refills: 11 | Status: SHIPPED | OUTPATIENT
Start: 2023-08-22 | End: 2024-01-04

## 2023-08-22 RX ORDER — LORATADINE 10 MG/1
10 TABLET ORAL DAILY
COMMUNITY

## 2023-08-22 NOTE — PROGRESS NOTES
Subjective:      Patient ID: Patricia Myers is a 53 y.o. female.    Chief Complaint: wellness, with multiple complaints Headache    Ms Arias is a 53-year-old female who is here today for a requested visit.  She wants wellness labs done as well and we will go ahead and get a wellness exam completed since this is long overdue.    Labs will be ordered and will follow-up on results.  Patient is advised to get them done at fasting level.      A separate E/M visit was performed for ongoing epigastric discomfort, shortness of breath on minimal exertion like climbing up stairs or walking, some chest discomfort however no chest pain and some left-sided arm pain which is continuous.    No complaints of nausea or vomiting.    Also has complaints of headaches.    We discussed sending a referral to Cardiology and she is in agreement.     ENT: Dr Cain      Wellness:  08/22/2023   MMG:  Order today   Pap:  Referred to gyn    The patient's Health Maintenance was reviewed and the following appears to be due at this time:   Health Maintenance Due   Topic Date Due    Hepatitis C Screening  Never done    Cervical Cancer Screening  Never done    COVID-19 Vaccine (1) Never done    HIV Screening  Never done    TETANUS VACCINE  Never done    Shingles Vaccine (1 of 2) Never done    Mammogram  06/07/2022        Past Medical History:  Past Medical History:   Diagnosis Date    Abdominal pain     Chronic GERD     SALCIDO (dyspnea on exertion)     Fatigue     H. pylori infection     Insulin resistance     Low serum HDL     Migraine     Vitamin D deficiency      Past Surgical History:   Procedure Laterality Date    COLONOSCOPY  08/21/2019     Review of patient's allergies indicates:  No Known Allergies  Social History     Socioeconomic History    Marital status:    Tobacco Use    Smoking status: Never    Smokeless tobacco: Never   Substance and Sexual Activity    Alcohol use: Not Currently     Social Determinants of Health     Financial  "Resource Strain: Low Risk  (8/22/2023)    Overall Financial Resource Strain (CARDIA)     Difficulty of Paying Living Expenses: Not hard at all   Food Insecurity: No Food Insecurity (8/22/2023)    Hunger Vital Sign     Worried About Running Out of Food in the Last Year: Never true     Ran Out of Food in the Last Year: Never true   Transportation Needs: No Transportation Needs (8/22/2023)    PRAPARE - Transportation     Lack of Transportation (Medical): No     Lack of Transportation (Non-Medical): No   Physical Activity: Insufficiently Active (8/22/2023)    Exercise Vital Sign     Days of Exercise per Week: 3 days     Minutes of Exercise per Session: 30 min   Stress: No Stress Concern Present (8/22/2023)    Citizen of [place] of Occupational Health - Occupational Stress Questionnaire     Feeling of Stress : Not at all   Social Connections: Socially Integrated (8/22/2023)    Social Connection and Isolation Panel [NHANES]     Frequency of Communication with Friends and Family: More than three times a week     Frequency of Social Gatherings with Friends and Family: More than three times a week     Attends Jehovah's witness Services: More than 4 times per year     Active Member of Clubs or Organizations: Yes     Attends Club or Organization Meetings: More than 4 times per year     Marital Status:    Housing Stability: Low Risk  (8/22/2023)    Housing Stability Vital Sign     Unable to Pay for Housing in the Last Year: No     Number of Places Lived in the Last Year: 1     Unstable Housing in the Last Year: No     Family History   Problem Relation Age of Onset    Hypertension Mother     Diabetes Father     Hypertension Father        Review of Systems    A comprehensive review of systems was performed and is negative except for that stated above  Objective:   BP (P) 124/80 (BP Location: Left arm, Patient Position: Sitting, BP Method: Small (Manual))   Pulse (P) 99   Ht (P) 5' 5" (1.651 m)   Wt (P) 78.2 kg (172 lb 6.4 oz)   " SpO2 (P) 99%   BMI (P) 28.69 kg/m²     Physical Exam  Constitutional:       Appearance: Normal appearance.   HENT:      Head: Normocephalic and atraumatic.      Nose: Nose normal.      Mouth/Throat:      Mouth: Mucous membranes are moist.      Pharynx: Oropharynx is clear.   Eyes:      Extraocular Movements: Extraocular movements intact.      Pupils: Pupils are equal, round, and reactive to light.   Cardiovascular:      Rate and Rhythm: Normal rate and regular rhythm.      Pulses: Normal pulses.   Pulmonary:      Effort: Pulmonary effort is normal.      Breath sounds: Normal breath sounds.   Abdominal:      General: Bowel sounds are normal.      Palpations: Abdomen is soft.   Musculoskeletal:         General: Normal range of motion.      Cervical back: Normal range of motion and neck supple.   Skin:     General: Skin is warm.   Neurological:      General: No focal deficit present.      Mental Status: She is alert and oriented to person, place, and time. Mental status is at baseline.   Psychiatric:         Mood and Affect: Mood normal.       Assessment/ Plan:   1. Wellness examination  Assessment & Plan:  -labs are ordered and will follow-up on results   -patient is advised on importance of watching her carbohydrate intake and saturated fat intake, making the right nutritional choices and exercising on a regular basis  -up-to-date with the screening      2. Radicular pain in left arm  Assessment & Plan:  -in the past an MRI of her neck was considered because of her radicular symptoms and ongoing complaints, unsure if she ever got this completed, will reorder  -heat/ice application   -Tylenol p.r.n.   -adding gabapentin to see if it would help with the pain    Orders:  -     Ambulatory referral/consult to Cardiology; Future; Expected date: 08/22/2023    3. Migraine without aura and without status migrainosus, not intractable  Assessment & Plan:  Headache Education Provided: Stressed importance of good sleep hygiene  and stress management.   Medication Overuse Education Provided: Using more than 3 OTC medications per week may worsen headaches.  Lifestyle Modifications Discussed: High intensity interval training has shown to reduce headache frequency. Low carb, high protein diet has shown to reduce headache frequency as well.  Instructed to keep headache journal.       4. Insulin resistance    5. Breast cancer screening by mammogram  -     Mammo Digital Screening Bilat; Future; Expected date: 08/22/2023    6. SOB (shortness of breath)  -     Ambulatory referral/consult to Cardiology; Future; Expected date: 08/22/2023    7. Dizziness  -     Ambulatory referral/consult to Cardiology; Future; Expected date: 08/22/2023    8. Blurry vision, bilateral  -     Ambulatory referral/consult to Ophthalmology; Future; Expected date: 08/22/2023    9. Screening for cervical cancer  -     Ambulatory referral/consult to Obstetrics / Gynecology; Future; Expected date: 08/22/2023    10. Epigastric pain  Assessment & Plan:  -on omeprazole, encouraged to continue   -getting an abdominal ultrasound considering patient's still has a gallbladder to evaluate for possible cholecystitis  -patient advised to avoid foods that trigger acid reflux and he had at least 2 hours before bedtime.      Orders:  -     US Abdomen Complete; Future; Expected date: 08/22/2023    11. SALCIDO (dyspnea on exertion)  Assessment & Plan:  -just for completion sake since patient has been having ongoing worsening complaints, I will go ahead and send a referral to Cardiology      Other orders  -     gabapentin (NEURONTIN) 100 MG capsule; Take 1 capsule (100 mg total) by mouth 2 (two) times daily.  Dispense: 60 capsule; Refill: 11

## 2023-08-22 NOTE — ASSESSMENT & PLAN NOTE
-just for completion sake since patient has been having ongoing worsening complaints, I will go ahead and send a referral to Cardiology

## 2023-08-22 NOTE — ASSESSMENT & PLAN NOTE
Headache Education Provided: Stressed importance of good sleep hygiene and stress management.   Medication Overuse Education Provided: Using more than 3 OTC medications per week may worsen headaches.  Lifestyle Modifications Discussed: High intensity interval training has shown to reduce headache frequency. Low carb, high protein diet has shown to reduce headache frequency as well.  Instructed to keep headache journal.

## 2023-08-22 NOTE — ASSESSMENT & PLAN NOTE
-in the past an MRI of her neck was considered because of her radicular symptoms and ongoing complaints, unsure if she ever got this completed, will reorder  -heat/ice application   -Tylenol p.r.n.   -adding gabapentin to see if it would help with the pain

## 2023-08-22 NOTE — ASSESSMENT & PLAN NOTE
-on omeprazole, encouraged to continue   -getting an abdominal ultrasound considering patient's still has a gallbladder to evaluate for possible cholecystitis  -patient advised to avoid foods that trigger acid reflux and he had at least 2 hours before bedtime.

## 2023-08-23 ENCOUNTER — LAB VISIT (OUTPATIENT)
Dept: LAB | Facility: HOSPITAL | Age: 53
End: 2023-08-23
Attending: INTERNAL MEDICINE
Payer: COMMERCIAL

## 2023-08-23 DIAGNOSIS — Z00.00 WELLNESS EXAMINATION: ICD-10-CM

## 2023-08-23 DIAGNOSIS — E88.819 INSULIN RESISTANCE: ICD-10-CM

## 2023-08-23 DIAGNOSIS — E55.9 VITAMIN D INSUFFICIENCY: ICD-10-CM

## 2023-08-23 LAB
ALBUMIN SERPL-MCNC: 4 G/DL (ref 3.5–5)
ALBUMIN/GLOB SERPL: 1.3 RATIO (ref 1.1–2)
ALP SERPL-CCNC: 57 UNIT/L (ref 40–150)
ALT SERPL-CCNC: 17 UNIT/L (ref 0–55)
ANISOCYTOSIS BLD QL SMEAR: ABNORMAL
AST SERPL-CCNC: 14 UNIT/L (ref 5–34)
BASOPHILS # BLD AUTO: 0.03 X10(3)/MCL
BASOPHILS NFR BLD AUTO: 0.6 %
BILIRUB SERPL-MCNC: 0.4 MG/DL
BUN SERPL-MCNC: 10.6 MG/DL (ref 9.8–20.1)
CALCIUM SERPL-MCNC: 9.5 MG/DL (ref 8.4–10.2)
CHLORIDE SERPL-SCNC: 105 MMOL/L (ref 98–107)
CHOLEST SERPL-MCNC: 120 MG/DL
CHOLEST/HDLC SERPL: 3 {RATIO} (ref 0–5)
CO2 SERPL-SCNC: 27 MMOL/L (ref 22–29)
CREAT SERPL-MCNC: 0.64 MG/DL (ref 0.55–1.02)
DEPRECATED CALCIDIOL+CALCIFEROL SERPL-MC: 15.5 NG/ML (ref 30–80)
EOSINOPHIL # BLD AUTO: 0.11 X10(3)/MCL (ref 0–0.9)
EOSINOPHIL NFR BLD AUTO: 2.1 %
ERYTHROCYTE [DISTWIDTH] IN BLOOD BY AUTOMATED COUNT: 19.5 % (ref 11.5–17)
EST. AVERAGE GLUCOSE BLD GHB EST-MCNC: 85.3 MG/DL
GFR SERPLBLD CREATININE-BSD FMLA CKD-EPI: >60 MLS/MIN/1.73/M2
GLOBULIN SER-MCNC: 3.1 GM/DL (ref 2.4–3.5)
GLUCOSE SERPL-MCNC: 94 MG/DL (ref 74–100)
HBA1C MFR BLD: 4.6 %
HCT VFR BLD AUTO: 26.7 % (ref 37–47)
HDLC SERPL-MCNC: 38 MG/DL (ref 35–60)
HGB BLD-MCNC: 7 G/DL (ref 12–16)
IMM GRANULOCYTES # BLD AUTO: 0.01 X10(3)/MCL (ref 0–0.04)
IMM GRANULOCYTES NFR BLD AUTO: 0.2 %
LDLC SERPL CALC-MCNC: 50 MG/DL (ref 50–140)
LYMPHOCYTES # BLD AUTO: 2.06 X10(3)/MCL (ref 0.6–4.6)
LYMPHOCYTES NFR BLD AUTO: 39 %
MCH RBC QN AUTO: 17.7 PG (ref 27–31)
MCHC RBC AUTO-ENTMCNC: 26.2 G/DL (ref 33–36)
MCV RBC AUTO: 67.6 FL (ref 80–94)
MICROCYTES BLD QL SMEAR: ABNORMAL
MONOCYTES # BLD AUTO: 0.3 X10(3)/MCL (ref 0.1–1.3)
MONOCYTES NFR BLD AUTO: 5.7 %
NEUTROPHILS # BLD AUTO: 2.77 X10(3)/MCL (ref 2.1–9.2)
NEUTROPHILS NFR BLD AUTO: 52.4 %
NRBC BLD AUTO-RTO: 0 %
PLATELET # BLD AUTO: 389 X10(3)/MCL (ref 130–400)
PLATELET # BLD EST: NORMAL 10*3/UL
PMV BLD AUTO: 9.8 FL (ref 7.4–10.4)
POTASSIUM SERPL-SCNC: 4.4 MMOL/L (ref 3.5–5.1)
PROT SERPL-MCNC: 7.1 GM/DL (ref 6.4–8.3)
RBC # BLD AUTO: 3.95 X10(6)/MCL (ref 4.2–5.4)
RBC MORPH BLD: ABNORMAL
SODIUM SERPL-SCNC: 140 MMOL/L (ref 136–145)
TEAR DROP CELL (OLG): ABNORMAL
TRIGL SERPL-MCNC: 159 MG/DL (ref 37–140)
TSH SERPL-ACNC: 1.07 UIU/ML (ref 0.35–4.94)
VLDLC SERPL CALC-MCNC: 32 MG/DL
WBC # SPEC AUTO: 5.28 X10(3)/MCL (ref 4.5–11.5)

## 2023-08-23 PROCEDURE — 80053 COMPREHEN METABOLIC PANEL: CPT

## 2023-08-23 PROCEDURE — 83036 HEMOGLOBIN GLYCOSYLATED A1C: CPT

## 2023-08-23 PROCEDURE — 82306 VITAMIN D 25 HYDROXY: CPT

## 2023-08-23 PROCEDURE — 85025 COMPLETE CBC W/AUTO DIFF WBC: CPT

## 2023-08-23 PROCEDURE — 36415 COLL VENOUS BLD VENIPUNCTURE: CPT

## 2023-08-23 PROCEDURE — 84443 ASSAY THYROID STIM HORMONE: CPT

## 2023-08-23 PROCEDURE — 80061 LIPID PANEL: CPT

## 2023-08-24 ENCOUNTER — TELEPHONE (OUTPATIENT)
Dept: INTERNAL MEDICINE | Facility: CLINIC | Age: 53
End: 2023-08-24
Payer: COMMERCIAL

## 2023-08-24 ENCOUNTER — TELEPHONE (OUTPATIENT)
Dept: INFUSION THERAPY | Facility: HOSPITAL | Age: 53
End: 2023-08-24
Payer: COMMERCIAL

## 2023-08-24 DIAGNOSIS — D64.9 SYMPTOMATIC ANEMIA: Primary | ICD-10-CM

## 2023-08-24 RX ORDER — HYDROCODONE BITARTRATE AND ACETAMINOPHEN 500; 5 MG/1; MG/1
TABLET ORAL ONCE
Status: CANCELLED | OUTPATIENT
Start: 2023-08-24 | End: 2023-08-24

## 2023-08-24 NOTE — TELEPHONE ENCOUNTER
Spoke with Mr Myers, and Pt son in depth about Ms. Arias's lab results. Gave them the A1C results, Lipid results, US results. Explained in depth that Pt is to lower her saturated fats, and exercise to lower her triglycerides, and help with her fatty liver. There were questions about putting Ms. Arias on medication. I let them know Dr Avelar would consider it, and discuss it at Pt next visit. I made it clear that Pt requires a blood transfusion, and the infusion center, in the [facility] building will be calling them. Mr Myers, and son expressed understanding.

## 2023-08-24 NOTE — TELEPHONE ENCOUNTER
Please call patient's  and notify him that patient's labs are reviewed and she is noted to be anemic.  Her shortness of breath and chest discomfort are probably coming from her anemia.  2 units of packed red blood cells have been ordered and patient needs to get this transfusion completed at the infusion center.  They should be contacting her for the type and crossmatch and the blood transfusion.

## 2023-08-24 NOTE — TELEPHONE ENCOUNTER
Ms Myers would like a prescription for Vitamin D for his wife Ms Arias. Pt Vitamin D is at 15.5. I explained that there was over the counter Vitamin D. Mr Myers wants a prescription. Please advise thank you

## 2023-08-24 NOTE — TELEPHONE ENCOUNTER
----- Message from Magdalena Watt sent at 8/24/2023  1:03 PM CDT -----  Regarding: test results  Type:  Test Results    Who Called: pt's   Name of Test (Lab/Mammo/Etc): labs   Date of Test: 8/23  Ordering Provider: dr dickerson  Where the test was performed:   Would the patient rather a call back or a response via [facility]? C/b  Best Call Back Number: [phone]  Additional Information:

## 2023-08-25 RX ORDER — ERGOCALCIFEROL 1.25 MG/1
50000 CAPSULE ORAL
Qty: 12 CAPSULE | Refills: 1 | Status: SHIPPED | OUTPATIENT
Start: 2023-08-25 | End: 2023-12-29

## 2023-08-25 NOTE — TELEPHONE ENCOUNTER
Medications Ordered This Encounter   Medications    ergocalciferol (ERGOCALCIFEROL) 50,000 unit Cap     Sig: Take 1 capsule (50,000 Units total) by mouth every 7 days.     Dispense:  12 capsule     Refill:  1      Sent, please notify, thank you

## 2023-08-30 ENCOUNTER — TELEPHONE (OUTPATIENT)
Dept: INTERNAL MEDICINE | Facility: CLINIC | Age: 53
End: 2023-08-30
Payer: COMMERCIAL

## 2023-08-31 ENCOUNTER — TELEPHONE (OUTPATIENT)
Dept: INTERNAL MEDICINE | Facility: CLINIC | Age: 53
End: 2023-08-31
Payer: COMMERCIAL

## 2023-08-31 NOTE — TELEPHONE ENCOUNTER
Called the Cancer Center, they have closed for the day. The blood transfusion has been ordered.

## 2023-08-31 NOTE — TELEPHONE ENCOUNTER
----- Message from Tesha Lazar sent at 8/31/2023  1:49 PM CDT -----  .Type:  Needs Medical Advice    Who Called: pt   Symptoms (please be specific):    How long has patient had these symptoms:    Pharmacy name and phone #:    Would the patient rather a call back or a response via [facility]?   Best Call Back Number: [phone]  Additional Information: pt  said they was supposed to get call back from referral about wife low blood count please advice

## 2023-08-31 NOTE — TELEPHONE ENCOUNTER
----- Message from Maria Alejandra Heaton sent at 8/31/2023  4:35 PM CDT -----  Regarding: Call back  .Type:  Patient Returning Call    Who Called:Naga   Who Left Message for Patient:Naga  [facility]  Would the patient rather a call back or a response via [facility]?   Best Call Back Number:[phone]   Additional Information: Please resend referral to cancer [facility]. Office is saying they did not get anything from

## 2023-08-31 NOTE — TELEPHONE ENCOUNTER
----- Message from Tesha Lazar sent at 8/31/2023  1:49 PM CDT -----  .Type:  Needs Medical Advice    Who Called: pt   Symptoms (please be specific):    How long has patient had these symptoms:    Pharmacy name and phone #:    Would the patient rather a call back or a response via [facility]?   Best Call Back Number: [phone]  Additional Information: pt  said they was supposed to get call back from referral about wife low blood count please advice

## 2023-08-31 NOTE — TELEPHONE ENCOUNTER
ALEX giving Mr Myers the Cancer [facility]'s phone number, so he can call and make an appt.

## 2023-09-01 NOTE — TELEPHONE ENCOUNTER
Spoke with Gila, and Yuliana at the Infusion Center. It was stated they do not see when we order a blood transfusion. We have to call and inform them. Ms Bridges now knows about the order, and will schedule the Pt, along with calling her .

## 2023-09-05 ENCOUNTER — LAB VISIT (OUTPATIENT)
Dept: LAB | Facility: HOSPITAL | Age: 53
End: 2023-09-05
Attending: INTERNAL MEDICINE
Payer: COMMERCIAL

## 2023-09-05 DIAGNOSIS — D64.9 SYMPTOMATIC ANEMIA: ICD-10-CM

## 2023-09-05 LAB
GROUP & RH: NORMAL
INDIRECT COOMBS GEL: NORMAL
SPECIMEN OUTDATE: NORMAL

## 2023-09-05 PROCEDURE — 86900 BLOOD TYPING SEROLOGIC ABO: CPT | Performed by: INTERNAL MEDICINE

## 2023-09-05 PROCEDURE — 36415 COLL VENOUS BLD VENIPUNCTURE: CPT

## 2023-09-06 ENCOUNTER — INFUSION (OUTPATIENT)
Dept: INFUSION THERAPY | Facility: HOSPITAL | Age: 53
End: 2023-09-06
Attending: INTERNAL MEDICINE
Payer: COMMERCIAL

## 2023-09-06 VITALS
BODY MASS INDEX: 28.86 KG/M2 | TEMPERATURE: 98 F | SYSTOLIC BLOOD PRESSURE: 109 MMHG | OXYGEN SATURATION: 99 % | HEIGHT: 65 IN | WEIGHT: 173.19 LBS | RESPIRATION RATE: 16 BRPM | HEART RATE: 75 BPM | DIASTOLIC BLOOD PRESSURE: 68 MMHG

## 2023-09-06 DIAGNOSIS — D64.9 SYMPTOMATIC ANEMIA: Primary | ICD-10-CM

## 2023-09-06 LAB
ABO + RH BLD: NORMAL
ABO + RH BLD: NORMAL
ABORH RETYPE: NORMAL
BLD PROD TYP BPU: NORMAL
BLD PROD TYP BPU: NORMAL
BLOOD UNIT EXPIRATION DATE: NORMAL
BLOOD UNIT EXPIRATION DATE: NORMAL
BLOOD UNIT TYPE CODE: 5100
BLOOD UNIT TYPE CODE: 5100
CROSSMATCH INTERPRETATION: NORMAL
CROSSMATCH INTERPRETATION: NORMAL
DISPENSE STATUS: NORMAL
DISPENSE STATUS: NORMAL
UNIT NUMBER: NORMAL
UNIT NUMBER: NORMAL

## 2023-09-06 PROCEDURE — 36415 COLL VENOUS BLD VENIPUNCTURE: CPT

## 2023-09-06 PROCEDURE — 86923 COMPATIBILITY TEST ELECTRIC: CPT | Performed by: INTERNAL MEDICINE

## 2023-09-06 PROCEDURE — 36430 TRANSFUSION BLD/BLD COMPNT: CPT

## 2023-09-06 PROCEDURE — P9016 RBC LEUKOCYTES REDUCED: HCPCS | Performed by: INTERNAL MEDICINE

## 2023-09-06 RX ORDER — HYDROCODONE BITARTRATE AND ACETAMINOPHEN 500; 5 MG/1; MG/1
TABLET ORAL ONCE
Status: DISCONTINUED | OUTPATIENT
Start: 2023-09-06 | End: 2023-09-06 | Stop reason: HOSPADM

## 2023-10-11 DIAGNOSIS — K21.9 GASTROESOPHAGEAL REFLUX DISEASE, UNSPECIFIED WHETHER ESOPHAGITIS PRESENT: ICD-10-CM

## 2023-10-11 RX ORDER — OMEPRAZOLE 40 MG/1
CAPSULE, DELAYED RELEASE ORAL
Qty: 30 CAPSULE | Refills: 5 | Status: SHIPPED | OUTPATIENT
Start: 2023-10-11

## 2023-10-12 ENCOUNTER — OFFICE VISIT (OUTPATIENT)
Dept: INTERNAL MEDICINE | Facility: CLINIC | Age: 53
End: 2023-10-12
Payer: COMMERCIAL

## 2023-10-12 DIAGNOSIS — D50.0 IRON DEFICIENCY ANEMIA DUE TO CHRONIC BLOOD LOSS: ICD-10-CM

## 2023-10-12 DIAGNOSIS — D64.9 ANEMIA, UNSPECIFIED TYPE: Primary | ICD-10-CM

## 2023-10-12 DIAGNOSIS — R21 RASH: ICD-10-CM

## 2023-10-12 DIAGNOSIS — R79.89 ABNORMAL CBC: ICD-10-CM

## 2023-10-12 DIAGNOSIS — Z23 NEED FOR VACCINATION: ICD-10-CM

## 2023-10-12 PROCEDURE — 90471 IMMUNIZATION ADMIN: CPT | Mod: ,,, | Performed by: INTERNAL MEDICINE

## 2023-10-12 PROCEDURE — 90471 FLU VACCINE (QUAD) GREATER THAN OR EQUAL TO 3YO PRESERVATIVE FREE IM: ICD-10-PCS | Mod: ,,, | Performed by: INTERNAL MEDICINE

## 2023-10-12 PROCEDURE — 85025 COMPLETE CBC W/AUTO DIFF WBC: CPT | Performed by: INTERNAL MEDICINE

## 2023-10-12 PROCEDURE — 90686 IIV4 VACC NO PRSV 0.5 ML IM: CPT | Mod: ,,, | Performed by: INTERNAL MEDICINE

## 2023-10-12 PROCEDURE — 99214 OFFICE O/P EST MOD 30 MIN: CPT | Mod: ,,, | Performed by: INTERNAL MEDICINE

## 2023-10-12 PROCEDURE — 99214 PR OFFICE/OUTPT VISIT, EST, LEVL IV, 30-39 MIN: ICD-10-PCS | Mod: ,,, | Performed by: INTERNAL MEDICINE

## 2023-10-12 PROCEDURE — 90686 FLU VACCINE (QUAD) GREATER THAN OR EQUAL TO 3YO PRESERVATIVE FREE IM: ICD-10-PCS | Mod: ,,, | Performed by: INTERNAL MEDICINE

## 2023-10-12 RX ORDER — FLUOCINOLONE ACETONIDE 0.1 MG/ML
1 SOLUTION TOPICAL 2 TIMES DAILY
Qty: 90 ML | Refills: 3 | Status: SHIPPED | OUTPATIENT
Start: 2023-10-12

## 2023-10-12 RX ORDER — FLUTICASONE PROPIONATE 0.05 MG/G
1 OINTMENT TOPICAL DAILY
Qty: 60 G | Refills: 3 | Status: SHIPPED | OUTPATIENT
Start: 2023-10-12

## 2023-10-12 RX ORDER — FERROUS SULFATE 325(65) MG
325 TABLET ORAL
Qty: 90 TABLET | Refills: 3 | Status: SHIPPED | OUTPATIENT
Start: 2023-10-12

## 2023-10-12 NOTE — ASSESSMENT & PLAN NOTE
-patient will get a CBC completed today, follow-up on results   -status post 2 units of packed red blood cell them 09/05/2023   -will need an OBGYN appointment, still has a cycle, referral has been sent, follow-up on appointment timing   -initiating on ferrous sulfate to take daily   -advised on side effects of constipation and importance of high-fiber diet and hydration

## 2023-10-12 NOTE — PROGRESS NOTES
Subjective:      Patient ID: Patricia Arias is a 53 y.o. female.    Chief Complaint: Dizziness and Fatigue    Ms Arias is a 53-year-old female who is here today for complaints of dizziness and fatigue.    The patient had been having ongoing complaints of fatigue and dizziness and hence labs were drawn.  At the time it showed a hemoglobin and hematocrit of 7.0 and 26.7.  2 units of packed red blood cells were ordered and patient has been transfused on 09/05/2023.  Repeat CBC has not been obtained, will order today.      At last visit patient was referred to OBGYN as well as Cardiology.  Patient has not had an appointment from either and is advised that we will follow-up on this and keep them posted.    No other acute needs or complaints today.          ENT: Dr Cain      Wellness:  08/22/2023   MMG:  Order today   Pap:  Referred to gyn    The patient's Health Maintenance was reviewed and the following appears to be due at this time:   Health Maintenance Due   Topic Date Due    Hepatitis C Screening  Never done    Cervical Cancer Screening  Never done    HIV Screening  Never done    TETANUS VACCINE  Never done    Shingles Vaccine (1 of 2) Never done    Influenza Vaccine (1) 09/01/2023    COVID-19 Vaccine (3 - 2023-24 season) 09/01/2023        Past Medical History:  Past Medical History:   Diagnosis Date    Abdominal pain     Chronic GERD     SALCIDO (dyspnea on exertion)     Fatigue     H. pylori infection     Insulin resistance     Low serum HDL     Migraine     Vitamin D deficiency      Past Surgical History:   Procedure Laterality Date    COLONOSCOPY  08/21/2019     Review of patient's allergies indicates:  No Known Allergies  Social History     Socioeconomic History    Marital status:    Tobacco Use    Smoking status: Never    Smokeless tobacco: Never   Substance and Sexual Activity    Alcohol use: Not Currently     Social Determinants of Health     Financial Resource Strain: Low Risk  (8/22/2023)     "Overall Financial Resource Strain (CARDIA)     Difficulty of Paying Living Expenses: Not hard at all   Food Insecurity: No Food Insecurity (8/22/2023)    Hunger Vital Sign     Worried About Running Out of Food in the Last Year: Never true     Ran Out of Food in the Last Year: Never true   Transportation Needs: No Transportation Needs (8/22/2023)    PRAPARE - Transportation     Lack of Transportation (Medical): No     Lack of Transportation (Non-Medical): No   Physical Activity: Insufficiently Active (8/22/2023)    Exercise Vital Sign     Days of Exercise per Week: 3 days     Minutes of Exercise per Session: 30 min   Stress: No Stress Concern Present (8/22/2023)    [employer] [place] [employer] - Occupational Stress Questionnaire     Feeling of Stress : Not at all   Social Connections: Socially Integrated (8/22/2023)    Social Connection and Isolation Panel [NHANES]     Frequency of Communication with Friends and Family: More than three times a week     Frequency of Social Gatherings with Friends and Family: More than three times a week     Attends Confucianism Services: More than 4 times per year     Active Member of Clubs or Organizations: Yes     Attends Club or Organization Meetings: More than 4 times per year     Marital Status:    Housing Stability: Low Risk  (8/22/2023)    Housing Stability Vital Sign     Unable to Pay for Housing in the Last Year: No     Number of Places Lived in the Last Year: 1     Unstable Housing in the Last Year: No     Family History   Problem Relation Age of Onset    Hypertension Mother     Diabetes Father     Hypertension Father        Review of Systems    A comprehensive review of systems was performed and is negative except for that stated above  Objective:   BP (P) 114/78 (BP Location: Left arm, Patient Position: Sitting, BP Method: Small (Manual))   Pulse (P) 87   Temp (P) 99 °F (37.2 °C) (Temporal)   Ht (P) 5' 5" (1.651 m)   Wt (P) 81.3 kg (179 lb 3.2 oz)   " SpO2 (P) 98%   BMI (P) 29.82 kg/m²     Physical Exam  Constitutional:       Appearance: Normal appearance.   HENT:      Head: Normocephalic and atraumatic.      Nose: Nose normal.      Mouth/Throat:      Mouth: Mucous membranes are moist.      Pharynx: Oropharynx is clear.   Eyes:      Extraocular Movements: Extraocular movements intact.      Pupils: Pupils are equal, round, and reactive to light.   Cardiovascular:      Rate and Rhythm: Normal rate and regular rhythm.      Pulses: Normal pulses.   Pulmonary:      Effort: Pulmonary effort is normal.      Breath sounds: Normal breath sounds.   Abdominal:      General: Bowel sounds are normal.      Palpations: Abdomen is soft.   Musculoskeletal:         General: Normal range of motion.      Cervical back: Normal range of motion and neck supple.   Skin:     General: Skin is warm.   Neurological:      General: No focal deficit present.      Mental Status: She is alert and oriented to person, place, and time. Mental status is at baseline.   Psychiatric:         Mood and Affect: Mood normal.       Assessment/ Plan:   1. Anemia, unspecified type  -     CBC Auto Differential; Future; Expected date: 10/12/2023    2. Abnormal CBC  -     CBC Auto Differential; Future; Expected date: 10/12/2023    3. Iron deficiency anemia due to chronic blood loss  Assessment & Plan:  -patient will get a CBC completed today, follow-up on results   -status post 2 units of packed red blood cell them 09/05/2023   -will need an OBGYN appointment, still has a cycle, referral has been sent, follow-up on appointment timing   -initiating on ferrous sulfate to take daily   -advised on side effects of constipation and importance of high-fiber diet and hydration      Other orders  -     ferrous sulfate (FEOSOL) 325 mg (65 mg iron) Tab tablet; Take 1 tablet (325 mg total) by mouth daily with breakfast.  Dispense: 90 tablet; Refill: 3

## 2023-10-16 ENCOUNTER — TELEPHONE (OUTPATIENT)
Dept: INTERNAL MEDICINE | Facility: CLINIC | Age: 53
End: 2023-10-16
Payer: COMMERCIAL

## 2023-10-16 NOTE — TELEPHONE ENCOUNTER
----- Message from HUBERT Ashton sent at 10/16/2023 12:20 PM CDT -----  Please contact the patient and let them know that their results for their blood counts were fine and do not require any change in treatment. Continue taking   Prescription iron.

## 2023-10-17 ENCOUNTER — PATIENT MESSAGE (OUTPATIENT)
Dept: ADMINISTRATIVE | Facility: HOSPITAL | Age: 53
End: 2023-10-17
Payer: COMMERCIAL

## 2023-10-26 ENCOUNTER — TELEPHONE (OUTPATIENT)
Dept: INTERNAL MEDICINE | Facility: CLINIC | Age: 53
End: 2023-10-26
Payer: COMMERCIAL

## 2023-10-26 DIAGNOSIS — Z12.4 SCREENING FOR CERVICAL CANCER: Primary | ICD-10-CM

## 2023-10-26 NOTE — TELEPHONE ENCOUNTER
Spoke with  [facility]. Will send out a second GYN referral. Also gave last lab result information. Mr Nielson understood no change in treatment at this time.

## 2023-10-26 NOTE — TELEPHONE ENCOUNTER
----- Message from Tesha Lazar sent at 10/26/2023 12:19 PM CDT -----  .Type:  Needs Medical Advice    Who Called: pt   Symptoms (please be specific):    How long has patient had these symptoms:    Pharmacy name and phone #:    Would the patient rather a call back or a response via [facility]? cb  Best Call Back Number: [phone]  Additional Information: pt  calling to see about the gyn dr that wife was referred to   .Type:  Test Results    Who Called: pt   Name of Test (Lab/Mammo/Etc): lab results  Date of Test:   Ordering Provider:   Where the test was performed:   Would the patient rather a call back or a response via [facility]?   Best Call Back Number:   Additional Information:  WANTS RESULTS

## 2023-11-07 ENCOUNTER — TELEPHONE (OUTPATIENT)
Dept: INTERNAL MEDICINE | Facility: CLINIC | Age: 53
End: 2023-11-07
Payer: COMMERCIAL

## 2023-11-07 NOTE — TELEPHONE ENCOUNTER
Pt  called about results that were preformed by Cardio, I placed a call to [facility] to let them know that pt was requesting the results for his wife, they stated that test were not done yet but scheduled 11/13 and 11/19, [facility] stated they would call pt, pt also asked about OB referral, stated they were taking to long, info for [facility] @[address] #[phone] to call and they may get a sooner marissa with the female NP

## 2023-11-27 PROBLEM — Z00.00 WELLNESS EXAMINATION: Status: RESOLVED | Noted: 2023-08-22 | Resolved: 2023-11-27

## 2023-12-21 DIAGNOSIS — D50.0 IRON DEFICIENCY ANEMIA DUE TO CHRONIC BLOOD LOSS: ICD-10-CM

## 2023-12-21 DIAGNOSIS — D64.9 ANEMIA, UNSPECIFIED TYPE: Primary | ICD-10-CM

## 2023-12-21 DIAGNOSIS — R79.89 ABNORMAL CBC: ICD-10-CM

## 2023-12-28 ENCOUNTER — TELEPHONE (OUTPATIENT)
Dept: INTERNAL MEDICINE | Facility: CLINIC | Age: 53
End: 2023-12-28
Payer: COMMERCIAL

## 2023-12-29 DIAGNOSIS — E55.9 VITAMIN D INSUFFICIENCY: Primary | ICD-10-CM

## 2023-12-29 RX ORDER — ERGOCALCIFEROL 1.25 MG/1
50000 CAPSULE ORAL
Qty: 12 CAPSULE | Refills: 1 | Status: SHIPPED | OUTPATIENT
Start: 2023-12-29